# Patient Record
Sex: FEMALE | Race: WHITE | Employment: PART TIME | URBAN - METROPOLITAN AREA
[De-identification: names, ages, dates, MRNs, and addresses within clinical notes are randomized per-mention and may not be internally consistent; named-entity substitution may affect disease eponyms.]

---

## 2017-09-19 ENCOUNTER — HOSPITAL ENCOUNTER (OUTPATIENT)
Age: 32
Setting detail: OBSERVATION
Discharge: HOME | End: 2017-09-22
Attending: EMERGENCY MEDICINE | Admitting: OBSTETRICS & GYNECOLOGY
Payer: COMMERCIAL

## 2017-09-19 DIAGNOSIS — D62 ANEMIA DUE TO BLOOD LOSS, ACUTE: ICD-10-CM

## 2017-09-19 DIAGNOSIS — N93.9 VAGINAL BLEEDING: Primary | ICD-10-CM

## 2017-09-19 PROBLEM — E03.9 ACQUIRED HYPOTHYROIDISM: Status: ACTIVE | Noted: 2017-09-19

## 2017-09-19 PROBLEM — E66.01 MORBID OBESITY (HCC): Status: ACTIVE | Noted: 2017-09-19

## 2017-09-19 PROBLEM — D64.9 ANEMIA: Status: ACTIVE | Noted: 2017-09-19

## 2017-09-19 PROBLEM — N92.1 MENOMETRORRHAGIA: Status: ACTIVE | Noted: 2017-09-19

## 2017-09-19 LAB
ANION GAP SERPL CALC-SCNC: 7 MMOL/L (ref 7–16)
ATRIAL RATE: 102 BPM
BASOPHILS # BLD: 0.1 K/UL (ref 0–0.2)
BASOPHILS NFR BLD MANUAL: 1 % (ref 0–2)
BUN SERPL-MCNC: 5 MG/DL (ref 6–23)
CALCIUM SERPL-MCNC: 8.2 MG/DL (ref 8.3–10.4)
CALCULATED P AXIS, ECG09: 66 DEGREES
CALCULATED R AXIS, ECG10: 74 DEGREES
CALCULATED T AXIS, ECG11: 56 DEGREES
CHLORIDE SERPL-SCNC: 104 MMOL/L (ref 98–107)
CO2 SERPL-SCNC: 26 MMOL/L (ref 21–32)
CREAT SERPL-MCNC: 0.67 MG/DL (ref 0.6–1)
DIAGNOSIS, 93000: NORMAL
DIFFERENTIAL METHOD BLD: ABNORMAL
EOSINOPHIL # BLD: 0.1 K/UL (ref 0–0.8)
EOSINOPHIL NFR BLD MANUAL: 1 % (ref 1–8)
ERYTHROCYTE [DISTWIDTH] IN BLOOD BY AUTOMATED COUNT: 12.1 % (ref 11.9–14.6)
FIBRINOGEN PPP-MCNC: 318 MG/DL (ref 172–437)
GLUCOSE SERPL-MCNC: 104 MG/DL (ref 65–100)
HCG UR QL: NEGATIVE
HCT VFR BLD AUTO: 20.6 % (ref 35.8–46.3)
HGB BLD-MCNC: 6.4 G/DL (ref 11.7–15.4)
INR PPP: 0.9 (ref 0.9–1.2)
LYMPHOCYTES # BLD: 3 K/UL (ref 0.5–4.6)
LYMPHOCYTES NFR BLD MANUAL: 24 % (ref 16–44)
MCH RBC QN AUTO: 29.8 PG (ref 26.1–32.9)
MCHC RBC AUTO-ENTMCNC: 31.1 G/DL (ref 31.4–35)
MCV RBC AUTO: 95.8 FL (ref 79.6–97.8)
MONOCYTES # BLD: 0.9 K/UL (ref 0.1–1.3)
MONOCYTES NFR BLD MANUAL: 7 % (ref 3–9)
NEUTS BAND NFR BLD MANUAL: 1 % (ref 0–6)
NEUTS SEG # BLD: 8.4 K/UL (ref 1.7–8.2)
NEUTS SEG NFR BLD MANUAL: 66 % (ref 47–75)
P-R INTERVAL, ECG05: 146 MS
PLATELET # BLD AUTO: 327 K/UL (ref 150–450)
PLATELET COMMENTS,PCOM: ADEQUATE
PMV BLD AUTO: 11.4 FL (ref 10.8–14.1)
POTASSIUM SERPL-SCNC: 4 MMOL/L (ref 3.5–5.1)
PROTHROMBIN TIME: 9.9 SEC (ref 9.6–12)
Q-T INTERVAL, ECG07: 394 MS
QRS DURATION, ECG06: 90 MS
QTC CALCULATION (BEZET), ECG08: 513 MS
RBC # BLD AUTO: 2.15 M/UL (ref 4.05–5.25)
RBC MORPH BLD: ABNORMAL
SERVICE CMNT-IMP: NORMAL
SODIUM SERPL-SCNC: 137 MMOL/L (ref 136–145)
T4 FREE SERPL-MCNC: 0.9 NG/DL (ref 0.78–1.46)
TSH SERPL DL<=0.005 MIU/L-ACNC: 7.53 UIU/ML (ref 0.36–3.74)
VENTRICULAR RATE, ECG03: 102 BPM
WBC # BLD AUTO: 12.5 K/UL (ref 4.3–11.1)
WET PREP GENITAL: NORMAL
WET PREP GENITAL: NORMAL

## 2017-09-19 PROCEDURE — 96376 TX/PRO/DX INJ SAME DRUG ADON: CPT

## 2017-09-19 PROCEDURE — 96360 HYDRATION IV INFUSION INIT: CPT | Performed by: EMERGENCY MEDICINE

## 2017-09-19 PROCEDURE — 87210 SMEAR WET MOUNT SALINE/INK: CPT | Performed by: OBSTETRICS & GYNECOLOGY

## 2017-09-19 PROCEDURE — 84481 FREE ASSAY (FT-3): CPT | Performed by: OBSTETRICS & GYNECOLOGY

## 2017-09-19 PROCEDURE — 74011250636 HC RX REV CODE- 250/636: Performed by: EMERGENCY MEDICINE

## 2017-09-19 PROCEDURE — 77030013131 HC IV BLD ST ICUM -A

## 2017-09-19 PROCEDURE — 96374 THER/PROPH/DIAG INJ IV PUSH: CPT

## 2017-09-19 PROCEDURE — 96375 TX/PRO/DX INJ NEW DRUG ADDON: CPT

## 2017-09-19 PROCEDURE — 96361 HYDRATE IV INFUSION ADD-ON: CPT | Performed by: EMERGENCY MEDICINE

## 2017-09-19 PROCEDURE — 85025 COMPLETE CBC W/AUTO DIFF WBC: CPT | Performed by: EMERGENCY MEDICINE

## 2017-09-19 PROCEDURE — 81025 URINE PREGNANCY TEST: CPT | Performed by: OBSTETRICS & GYNECOLOGY

## 2017-09-19 PROCEDURE — P9059 PLASMA, FRZ BETWEEN 8-24HOUR: HCPCS | Performed by: OBSTETRICS & GYNECOLOGY

## 2017-09-19 PROCEDURE — 84439 ASSAY OF FREE THYROXINE: CPT | Performed by: OBSTETRICS & GYNECOLOGY

## 2017-09-19 PROCEDURE — 85610 PROTHROMBIN TIME: CPT | Performed by: EMERGENCY MEDICINE

## 2017-09-19 PROCEDURE — 99284 EMERGENCY DEPT VISIT MOD MDM: CPT | Performed by: EMERGENCY MEDICINE

## 2017-09-19 PROCEDURE — 93005 ELECTROCARDIOGRAM TRACING: CPT | Performed by: EMERGENCY MEDICINE

## 2017-09-19 PROCEDURE — 85384 FIBRINOGEN ACTIVITY: CPT | Performed by: OBSTETRICS & GYNECOLOGY

## 2017-09-19 PROCEDURE — 36430 TRANSFUSION BLD/BLD COMPNT: CPT

## 2017-09-19 PROCEDURE — 87491 CHLMYD TRACH DNA AMP PROBE: CPT | Performed by: OBSTETRICS & GYNECOLOGY

## 2017-09-19 PROCEDURE — 84443 ASSAY THYROID STIM HORMONE: CPT | Performed by: OBSTETRICS & GYNECOLOGY

## 2017-09-19 PROCEDURE — 99218 HC RM OBSERVATION: CPT

## 2017-09-19 PROCEDURE — 74011250636 HC RX REV CODE- 250/636: Performed by: OBSTETRICS & GYNECOLOGY

## 2017-09-19 PROCEDURE — 86923 COMPATIBILITY TEST ELECTRIC: CPT | Performed by: EMERGENCY MEDICINE

## 2017-09-19 PROCEDURE — 86900 BLOOD TYPING SEROLOGIC ABO: CPT | Performed by: EMERGENCY MEDICINE

## 2017-09-19 PROCEDURE — 80048 BASIC METABOLIC PNL TOTAL CA: CPT | Performed by: EMERGENCY MEDICINE

## 2017-09-19 PROCEDURE — P9016 RBC LEUKOCYTES REDUCED: HCPCS | Performed by: EMERGENCY MEDICINE

## 2017-09-19 PROCEDURE — 74011000250 HC RX REV CODE- 250: Performed by: OBSTETRICS & GYNECOLOGY

## 2017-09-19 RX ORDER — FUROSEMIDE 10 MG/ML
20 INJECTION INTRAMUSCULAR; INTRAVENOUS ONCE
Status: COMPLETED | OUTPATIENT
Start: 2017-09-19 | End: 2017-09-19

## 2017-09-19 RX ORDER — LEVOTHYROXINE SODIUM 75 UG/1
75 TABLET ORAL
Status: DISCONTINUED | OUTPATIENT
Start: 2017-09-20 | End: 2017-09-22 | Stop reason: HOSPADM

## 2017-09-19 RX ORDER — NORGESTIMATE AND ETHINYL ESTRADIOL 0.25-0.035
1 KIT ORAL DAILY
COMMUNITY
End: 2017-09-22

## 2017-09-19 RX ORDER — SODIUM CHLORIDE 9 MG/ML
250 INJECTION, SOLUTION INTRAVENOUS AS NEEDED
Status: DISCONTINUED | OUTPATIENT
Start: 2017-09-19 | End: 2017-09-22 | Stop reason: HOSPADM

## 2017-09-19 RX ORDER — DIPHENHYDRAMINE HCL 25 MG
25 CAPSULE ORAL
Status: DISCONTINUED | OUTPATIENT
Start: 2017-09-19 | End: 2017-09-22 | Stop reason: HOSPADM

## 2017-09-19 RX ADMIN — SODIUM CHLORIDE 250 ML: 900 INJECTION, SOLUTION INTRAVENOUS at 19:36

## 2017-09-19 RX ADMIN — WATER 25 MG: 1 INJECTION INTRAMUSCULAR; INTRAVENOUS; SUBCUTANEOUS at 18:20

## 2017-09-19 RX ADMIN — FUROSEMIDE 20 MG: 10 INJECTION, SOLUTION INTRAMUSCULAR; INTRAVENOUS at 22:06

## 2017-09-19 RX ADMIN — SODIUM CHLORIDE 1000 ML: 900 INJECTION, SOLUTION INTRAVENOUS at 13:22

## 2017-09-19 RX ADMIN — WATER 25 MG: 1 INJECTION INTRAMUSCULAR; INTRAVENOUS; SUBCUTANEOUS at 22:06

## 2017-09-19 NOTE — ED NOTES
TRANSFER - OUT REPORT:    Verbal report given to DOROTHY Romero on Lalitha Marshall  being transferred to [facility] for routine progression of care       Report consisted of patients Situation, Background, Assessment and   Recommendations(SBAR). Information from the following report(s) SBAR, ED Summary, Intake/Output, MAR and Cardiac Rhythm NSR was reviewed with the receiving nurse. Lines:   Peripheral IV 09/19/17 Right Arm (Active)   Site Assessment Clean, dry, & intact 9/19/2017  1:22 PM   Phlebitis Assessment 0 9/19/2017  1:22 PM   Infiltration Assessment 0 9/19/2017  1:22 PM   Dressing Status Clean, dry, & intact 9/19/2017  1:22 PM   Dressing Type Tape;Transparent 9/19/2017  1:22 PM        Opportunity for questions and clarification was provided. Patient transported with:   Registered Nurse    VTE prophylaxis orders have not been written for Lalitha Marshall. Patient and family given floor number and nurses name. Family updated re: pt status after security code verified.

## 2017-09-19 NOTE — ED PROVIDER NOTES
HPI Comments: Patient presents to the ER complaining of continued vaginal bleeding. Patient states she's had daily vaginal bleeding for approximately 11 weeks. States she was seen by her normal gynecologist last week and started on birth control. States bleeding has persisted. Now she has developed lightheadedness and shortness of breath. Reports an episode of syncope last evening. Was seen at her primary care physician's office today and noted to have a low hemoglobin of 5.9. She was sent to the ER for further evaluation. Patient is a 28 y.o. female presenting with vaginal bleeding. The history is provided by the patient. Vaginal Bleeding   This is a recurrent problem. The current episode started more than 1 week ago. The problem occurs daily. Associated symptoms include abdominal pain and shortness of breath. Pertinent negatives include no chest pain and no headaches. Past Medical History:   Diagnosis Date    Gallbladder disease     awaiting surgery    Knee pain 12/10/2012    Morbid obesity (HCC)     BMI 39.9    PCOS (polycystic ovarian syndrome) 12/10/2012       Past Surgical History:   Procedure Laterality Date    HX APPENDECTOMY  2002    HX CHOLECYSTECTOMY  2014         Family History:   Problem Relation Age of Onset    Seizures Sister     Diabetes Father     Hypertension Father     Cancer Maternal Grandmother      lung cancer    Cancer Maternal Grandfather     Cancer Paternal Grandfather      cirrhosis; colon cancer       Social History     Social History    Marital status:      Spouse name: N/A    Number of children: N/A    Years of education: N/A     Occupational History    Not on file.      Social History Main Topics    Smoking status: Never Smoker    Smokeless tobacco: Not on file    Alcohol use No    Drug use: No    Sexual activity: Yes     Other Topics Concern    Not on file     Social History Narrative         ALLERGIES: Review of patient's allergies indicates no known allergies. Review of Systems   Constitutional: Positive for fatigue. Negative for fever and unexpected weight change. HENT: Negative for congestion. Eyes: Negative for photophobia and visual disturbance. Respiratory: Positive for chest tightness and shortness of breath. Negative for cough and choking. Cardiovascular: Negative for chest pain and leg swelling. Gastrointestinal: Positive for abdominal pain. Negative for vomiting. Endocrine: Negative for polydipsia and polyphagia. Genitourinary: Positive for vaginal bleeding. Negative for frequency and pelvic pain. Musculoskeletal: Negative for back pain and gait problem. Allergic/Immunologic: Negative for food allergies and immunocompromised state. Neurological: Positive for syncope and light-headedness. Negative for numbness and headaches. All other systems reviewed and are negative. There were no vitals filed for this visit. Physical Exam   Constitutional: She is oriented to person, place, and time. She appears well-developed and well-nourished. HENT:   Head: Normocephalic and atraumatic. Mouth/Throat: Oropharynx is clear and moist.   Eyes: Conjunctivae are normal. Pupils are equal, round, and reactive to light. Neck: Normal range of motion. Neck supple. Cardiovascular: Regular rhythm, normal heart sounds and intact distal pulses. Tachycardia present. Pulmonary/Chest: Effort normal and breath sounds normal. No respiratory distress. She has no wheezes. Abdominal: Soft. Bowel sounds are normal. She exhibits no distension. Neurological: She is alert and oriented to person, place, and time. She has normal reflexes. No cranial nerve deficit. Nursing note and vitals reviewed.        MDM  Number of Diagnoses or Management Options  Anemia due to blood loss, acute:   Vaginal bleeding:   Diagnosis management comments: GYN has been paged  Will repeat labs, type and cross patient for 4 units  Also will give IV fluids    1:59 PM  Hgb is 6.4  Gyn currently evaluating patient       Amount and/or Complexity of Data Reviewed  Clinical lab tests: ordered and reviewed    Risk of Complications, Morbidity, and/or Mortality  Presenting problems: moderate  Diagnostic procedures: moderate  Management options: moderate    Patient Progress  Patient progress: stable    ED Course       Procedures

## 2017-09-19 NOTE — ED TRIAGE NOTES
Patient arrives from PCP office for vaginal bleeding for 11 days and HGB 5.9. Dr. Oriana Recinos @ bedside.

## 2017-09-19 NOTE — IP AVS SNAPSHOT
Johan Scott 
 
 
 [id number] [facility] 
[phone] Patient: Princess Jernigan MRN: [id number] You are allergic to the following No active allergies Recent Documentation Height Weight BMI OB Status Smoking Status 1.6 m 115.2 kg 44.99 kg/m2 Having regular periods Never Smoker Unresulted Labs Order Current Status FFP, ALLOCATE Preliminary result TYPE + CROSSMATCH Preliminary result Emergency Contacts Name Discharge Info Relation Home Work Mobile Onofre Caban  Spouse [3] [phone] Sirena Burt  Parent [1] [phone] About your hospitalization You were admitted on:  September 19, 2017 You last received care in the:  [address] You were discharged on:  September 22, 2017 Unit phone number:  [phone] Why you were hospitalized Your primary diagnosis was:  Acute Blood Loss Anemia Your diagnoses also included:  Menometrorrhagia, Morbid Obesity (Hcc), Acquired Hypothyroidism, Ovarian Cyst  
  
  
 
  
  
Providers Seen During Your Hospitalizations Provider Role Specialty Primary office phone Javid Olmedo MD Attending Provider Emergency Medicine [phone] Verenice Angelo MD Attending Provider Obstetrics & Gynecology [phone] Your Primary Care Physician (PCP) Primary Care Physician Office Phone Office Fax Gian Bob [phone] Follow-up Information Follow up With Details Comments Contact Info Nneka Marquez MD   [address] 
[phone] Current Discharge Medication List  
  
START taking these medications Dose & Instructions Dispensing Information Comments Morning Noon Evening Bedtime  
 conjugated estrogens 1.25 mg tablet Commonly known as:  PREMARIN Your last dose was: Your next dose is: Dose:  2.5 mg Take 2 Tabs by mouth four (4) times daily. Quantity:  120 Tab Refills:  1  
     
   
   
   
  
 ferrous sulfate 325 mg (65 mg iron) tablet Your last dose was: Your next dose is:    
   
   
 Dose:  325 mg Take 1 Tab by mouth two (2) times daily (with meals). Quantity:  60 Tab Refills:  6  
     
   
   
   
  
 levothyroxine 75 mcg tablet Commonly known as:  SYNTHROID Your last dose was: Your next dose is:    
   
   
 Dose:  75 mcg Take 1 Tab by mouth Daily (before breakfast). Quantity:  30 Tab Refills:  12  
     
   
   
   
  
 medroxyPROGESTERone 10 mg tablet Commonly known as:  PROVERA Your last dose was: Your next dose is:    
   
   
 Dose:  10 mg Take 1 Tab by mouth daily. Quantity:  30 Tab Refills:  3 STOP taking these medications OTHER  
   
  
 SPRINTEC (28) 0.25-35 mg-mcg Tab Generic drug:  norgestimate-ethinyl estradiol Where to Get Your Medications Information on where to get these meds will be given to you by the nurse or doctor. ! Ask your nurse or doctor about these medications  
  conjugated estrogens 1.25 mg tablet  
 ferrous sulfate 325 mg (65 mg iron) tablet  
 levothyroxine 75 mcg tablet  
 medroxyPROGESTERone 10 mg tablet Discharge Instructions Abnormal Uterine Bleeding: Care Instructions Your Care Instructions Abnormal uterine bleeding (AUB) is irregular bleeding from the uterus that is longer or heavier than usual or does not occur at your regular time. Sometimes it is caused by changes in hormone levels. It can also be caused by growths in the uterus, such as fibroids or polyps. Sometimes a cause cannot be found. You may have heavy bleeding when you are not expecting your period. Your doctor may suggest a pregnancy test, if you think you are pregnant. Follow-up care is a key part of your treatment and safety. Be sure to make and go to all appointments, and call your doctor if you are having problems. It's also a good idea to know your test results and keep a list of the medicines you take. How can you care for yourself at home? · Be safe with medicines. Take pain medicines exactly as directed. ¨ If the doctor gave you a prescription medicine for pain, take it as prescribed. ¨ If you are not taking a prescription pain medicine, ask your doctor if you can take an over-the-counter medicine. · You may be low in iron because of blood loss. Eat a balanced diet that is high in iron and vitamin C. Foods rich in iron include red meat, shellfish, eggs, beans, and leafy green vegetables. Talk to your doctor about whether you need to take iron pills or a multivitamin. When should you call for help? Call 911 anytime you think you may need emergency care. For example, call if: 
· You passed out (lost consciousness). Call your doctor now or seek immediate medical care if: 
· You have sudden, severe pain in your belly or pelvis. · You have severe vaginal bleeding. You are soaking through your usual pads or tampons every hour for 2 or more hours. · You feel dizzy or lightheaded, or you feel like you may faint. Watch closely for changes in your health, and be sure to contact your doctor if: 
· You have new belly or pelvic pain. · You have a fever. · Your bleeding gets worse or lasts longer than 1 week. · You think you may be pregnant. Where can you learn more? Go to http://tan-monalisa.info/. Enter G460 in the search box to learn more about \"Abnormal Uterine Bleeding: Care Instructions. \" Current as of: October 13, 2016 Content Version: 11.3 © 6967-6312 [employer], [employer].  Care instructions adapted under license by [employer] (which disclaims liability or warranty for this information). If you have questions about a medical condition or this instruction, always ask your healthcare professional. [address] any warranty or liability for your use of this information. DISCHARGE SUMMARY from Nurse The following personal items are in your possession at time of discharge: 
 
  
  
  
  
  
Clothing: At bedside PATIENT INSTRUCTIONS: 
 
Assessment and Plan:  D/c home on this regimen. Office 1-2wks. D/w pt and partner that this does not provide BC, and she should not get pregnant right now. D/w with them that this adnexal mass needs to be addressed. After general anesthesia or intravenous sedation, for 24 hours or while taking prescription Narcotics: · Limit your activities · Do not drive and operate hazardous machinery · Do not make important personal or business decisions · Do  not drink alcoholic beverages · If you have not urinated within 8 hours after discharge, please contact your surgeon on call. Report the following to your surgeon: 
· Excessive pain, swelling, redness or odor of or around the surgical area · Temperature over 100.5 · Nausea and vomiting lasting longer than 4 hours or if unable to take medications · Any signs of decreased circulation or nerve impairment to extremity: change in color, persistent  numbness, tingling, coldness or increase pain · Any questions What to do at Home: 
Recommended activity: Activity as tolerated. If you experience any of the following symptoms increase in bleeding, dizziness/chest pain, please follow up with MD. 
 
 
*  Please give a list of your current medications to your Primary Care Provider. *  Please update this list whenever your medications are discontinued, doses are 
    changed, or new medications (including over-the-counter products) are added. *  Please carry medication information at all times in case of emergency situations. These are general instructions for a healthy lifestyle: No smoking/ No tobacco products/ Avoid exposure to second hand smoke Surgeon General's Warning:  Quitting smoking now greatly reduces serious risk to your health. Obesity, smoking, and sedentary lifestyle greatly increases your risk for illness A healthy diet, regular physical exercise & weight monitoring are important for maintaining a healthy lifestyle You may be retaining fluid if you have a history of heart failure or if you experience any of the following symptoms:  Weight gain of 3 pounds or more overnight or 5 pounds in a week, increased swelling in our hands or feet or shortness of breath while lying flat in bed. Please call your doctor as soon as you notice any of these symptoms; do not wait until your next office visit. Recognize signs and symptoms of STROKE: 
 
F-face looks uneven A-arms unable to move or move unevenly S-speech slurred or non-existent T-time-call 911 as soon as signs and symptoms begin-DO NOT go Back to bed or wait to see if you get better-TIME IS BRAIN. Warning Signs of HEART ATTACK Call 911 if you have these symptoms: 
? Chest discomfort. Most heart attacks involve discomfort in the center of the chest that lasts more than a few minutes, or that goes away and comes back. It can feel like uncomfortable pressure, squeezing, fullness, or pain. ? Discomfort in other areas of the upper body. Symptoms can include pain or discomfort in one or both arms, the back, neck, jaw, or stomach. ? Shortness of breath with or without chest discomfort. ? Other signs may include breaking out in a cold sweat, nausea, or lightheadedness. Don't wait more than five minutes to call [address]! Fast action can save your life. Calling 911 is almost always the fastest way to get lifesaving treatment.  Emergency Medical Services staff can begin treatment when they arrive  up to an hour sooner than if someone gets to the hospital by car. The discharge information has been reviewed with the patient. The patient verbalized understanding. Discharge medications reviewed with the patient and appropriate educational materials and side effects teaching were provided. Hand-Washing: Care Instructions Your Care Instructions It is important for caregivers to wash their hands properly. This is the single best way to prevent the spread of infections. Hand-washing can help keep you from getting sick. It is easy, doesn't cost much, and it works. Make sure that you and your caregivers follow safe hand-washing routines. Caregivers may include health care workers or family members at home or in a care facility. You can talk to them about this information on hand-washing. Follow-up care is a key part of your treatment and safety. Be sure to make and go to all appointments, and call your doctor if you are having problems. It's also a good idea to know your test results and keep a list of the medicines you take. How can you care for yourself at home? · Caregivers should wash their hands with soap and water: ¨ When their hands are dirty, especially after being exposed to body fluids. This includes blood. ¨ When their hands may have been exposed to germs that could spread infection. ¨ After they touch broken skin, sores, or wound bandages. ¨ After they use the bathroom. · At other times, caregivers can use an alcohol-based gel  or soap and water to clean hands. This should be done: ¨ Before and after any contact with you. ¨ After they take off gloves. ¨ Before they handle a device that touches your body (even if gloves are used). ¨ After they touch any objects near you, such as medical equipment, lights, or doorknobs. ¨ Before they handle medicine or prepare food. Proper hand-washing for caregivers · When using an alcohol-based gel , fill your palm with the gel. Then spread it all over your hands. Rub your hands together until they are dry. · When washing hands with soap and water: ¨ Get your hands wet. Then use enough soap to cover your whole hands. ¨ Rub your hands together hard, in a [place]. Be sure that you cover all surfaces. Rub your wrists, the backs of your hands, between your fingers, and under your fingernails. Wash your hands for at least 30 seconds. ¨ Rinse your hands with water. But don't use hot water. It may irritate your hands. ¨ Use a paper towel to hold the faucet handle when you turn off the water. ¨ Dry your hands well with a paper towel. Don't use towels that have been used by others or used more than once. · If you use bar soap, use small bars. Set the soap on a rack that lets water drain. Where can you learn more? Go to http://tan-monalisa.info/. Enter V076 in the search box to learn more about \"Hand-Washing: Care Instructions. \" Current as of: March 3, 2017 Content Version: 11.3 © 6133-6072 [employer]. Care instructions adapted under license by [employer] (which disclaims liability or warranty for this information). If you have questions about a medical condition or this instruction, always ask your healthcare professional. [address] [address] any warranty or liability for your use of this information. Secondhand Smoke: Care Instructions Your Care Instructions Secondhand smoke comes from the burning end of a cigarette, cigar, or pipe and the smoke that a smoker exhales. The smoke contains nicotine and many other harmful chemicals. Breathing secondhand smoke can cause or worsen health problems including cancer, asthma, coronary artery disease, and respiratory infections. It can make your eyes and nose burn and cause a sore throat. Secondhand smoke is especially bad for babies and young children whose lungs are still developing. Babies whose parents smoke are more likely to have ear infections, pneumonia, and bronchitis in the first few years of their lives. Secondhand smoke can make asthma symptoms worse in children. If you are pregnant, it is important that you not smoke and that you avoid secondhand smoke. You are more likely to give birth to a baby who weighs less than expected (low birth weight) if you smoke. And your baby may have a greater risk for sudden infant death syndrome (SIDS). Babies whose mothers are exposed to secondhand smoke during pregnancy have a higher risk for health problems. Follow-up care is a key part of your treatment and safety. Be sure to make and go to all appointments, and call your doctor if you are having problems. It's also a good idea to know your test results and keep a list of the medicines you take. How can you care for yourself at home? · Do not smoke or let anyone else smoke in your home. If people must smoke, ask them to go outside. · If people do smoke in your home, choose a room where you can open a window or use a fan to get the smoke outside. · Do not let anyone smoke in your car. If someone must smoke, pull over in a safe place and let him or her smoke away from the car. · Ask your employer to make sure that you have a smoke-free work area. · Make sure that your children are not exposed to secondhand smoke at day care, school, and after-school programs. · Try to choose nonsmoking bars, restaurants, and other public places when you go out. · Help your family and friends who smoke to quit by encouraging them to try. Tell them about treatment resources. Having support from others often helps. · If you smoke, quit. Quitting is hard, but there are ways to boost your chance of quitting tobacco for good. ¨ Use nicotine gum, patches, or lozenges. Call a quitline.  Ask your doctor about stop-smoking programs and medicines. ¨ Keep trying. When should you call for help? Watch closely for changes in your health, and be sure to contact your doctor if you have any problems. Where can you learn more? Go to http://tan-monalisa.info/. Enter L004 in the search box to learn more about \"Secondhand Smoke: Care Instructions. \" Current as of: March 20, 2017 Content Version: 11.3 © 5258-1762 [employer]. Care instructions adapted under license by [employer] (which disclaims liability or warranty for this information). If you have questions about a medical condition or this instruction, always ask your healthcare professional. [address] any warranty or liability for your use of this information. Discharge Orders None [facility] Announcement We are excited to announce that we are making your provider's discharge notes available to you in [facility]. You will see these notes when they are completed and signed by the physician that discharged you from your recent hospital stay. If you have any questions or concerns about any information you see in [facility], please call the Health Information Department where you were seen or reach out to your Primary Care Provider for more information about your plan of care. Introducing [facility]! Tera Alexis introduces [facility] patient portal. Now you can access parts of your medical record, email your doctor's office, and request medication refills online. 1. In your internet browser, go to [email or web address] 2. Click on the First Time User? Click Here link in the Sign In box. You will see the New Member Sign Up page. 3. Enter your [facility] Access Code exactly as it appears below. You will not need to use this code after youve completed the sign-up process. If you do not sign up before the expiration date, you must request a new code. · [facility] Access Code: [email or web address] Expires: 12/18/2017 11:33 AM 
 
4. Enter the last four digits of your Social Security Number (xxxx) and Date of Birth (mm/dd/yyyy) as indicated and click Submit. You will be taken to the next sign-up page. 5. Create a [facility] ID. This will be your [facility] login ID and cannot be changed, so think of one that is secure and easy to remember. 6. Create a [facility] password. You can change your password at any time. 7. Enter your Password Reset Question and Answer. This can be used at a later time if you forget your password. 8. Enter your e-mail address. You will receive e-mail notification when new information is available in [address] 9. Click Sign Up. You can now view and download portions of your medical record. 10. Click the Download Summary menu link to download a portable copy of your medical information. If you have questions, please visit the Frequently Asked Questions section of the [facility] website. Remember, [facility] is NOT to be used for urgent needs. For medical emergencies, dial 911. Now available from your iPhone and Android! General Information Please provide this summary of care documentation to your next provider. Patient Signature:  ____________________________________________________________ Date:  ____________________________________________________________  
  
Guinevere Coup Provider Signature:  ____________________________________________________________ Date:  ____________________________________________________________

## 2017-09-19 NOTE — H&P
.  Gynecology History and Physical    Name: Michael Valadez MRN: [id number] SSN: [id number]    YOB: 1985  Age: 28 y.o. Sex: female       Subjective:      Chief complaint:  Heavy bleeding, anemia     Dontrell Carrillo is a 28 y.o. G0  transferred from [facility] due to hgb 5.8 and continued VB. Pt reports a long hx of PCOS. Was placed on OCPs at the age of 15yo but DC'd these 2yrs ago in attempts to conceive. When she DC'd her OCPs, she reports having fairly light and regular periods Q month until about 4.5 months ago at which time she stopped having periods. She went to a Naturopath who told her that her hormones were \"all messed up\" and placed her on Iodine and herbs. She started having bleeding again 11wks ago--off and on heavy/light. As of 3wks ago she began having significantly heavier VB requiring pad changes Q1hr. States can't wear tampons b/c they just soak through. C/o associated significant fatigue and lightheadedness, possible episode of syncope. Denies CP but +palpitations. Some associated cramping, no VB. No hx STDs. Reports being seen in the ER at [facility] 1wk ago for c/o VB but hgb was stable at 10 at that time and she was sent home on [address]. No pelvic US was performed. States her VB has been unchanged since starting the OCP. Seen in office today by PCP Dr. Anila Duncan and POC hgb 5.8-->sent here for further management. Pt is morbidly obese--states has gained 25lbs in past 4 months but denies any change in lifestyle/eating/exercise. States she was also told by her Naturopath that her T3 was low but the Iodine should help.       OB History     No data available        Past Medical History:   Diagnosis Date    Gallbladder disease     awaiting surgery    Knee pain 12/10/2012    Morbid obesity (HCC)     BMI 39.9    PCOS (polycystic ovarian syndrome) 12/10/2012     Past Surgical History:   Procedure Laterality Date    HX APPENDECTOMY 2002    HX CHOLECYSTECTOMY  2014     Social History     Occupational History    Not on file. Social History Main Topics    Smoking status: Never Smoker    Smokeless tobacco: Not on file    Alcohol use No    Drug use: No    Sexual activity: Yes     Family History   Problem Relation Age of Onset    Seizures Sister     Diabetes Father     Hypertension Father     Cancer Maternal Grandmother      lung cancer    Cancer Maternal Grandfather     Cancer Paternal Grandfather      cirrhosis; colon cancer        No Known Allergies  Prior to Admission medications    Medication Sig Start Date End Date Taking? Authorizing Provider   norgestimate-ethinyl estradiol ([id number] [id number],) 0.25-35 mg-mcg tab Take 1 Tab by mouth daily. Yes Phys Other, MD   OTHER Indications: Iodral supplement   Yes Phys Other, MD        Review of Systems  . Review of Systems   Constitutional: Positive for fatigue. Negative for fever and unexpected weight change. HENT: Negative for congestion. Eyes: Negative for photophobia and visual disturbance. Respiratory: Positive for chest tightness and shortness of breath. Negative for cough and choking. Cardiovascular: Negative for chest pain and leg swelling. Gastrointestinal: Positive for abdominal pain. Negative for vomiting. Endocrine: Negative for polydipsia and polyphagia. Genitourinary: Positive for vaginal bleeding. Negative for frequency and pelvic pain. Musculoskeletal: Negative for back pain and gait problem. Allergic/Immunologic: Negative for food allergies and immunocompromised state. Neurological: Positive for syncope and light-headedness. Negative for numbness and headaches. All other systems reviewed and are negative.     Objective:     Vitals:    09/19/17 1306 09/19/17 1331   BP: 126/64 119/72   Pulse: (!) 115 (!) 101   Resp: 18    Temp: 97.9 °F (36.6 °C)    SpO2: 100% 100%   Weight: 254 lb (115.2 kg)    Height: 5' 3\" (1.6 m)        Physical Exam:  Constitutional: She appears well-developed and well-nourished. No distress. HENT:    Head: Normocephalic and atraumatic. Lungs: CTAB, effort normal   Cardiovascular: Tachy, RR   Abd:  Morbidly obese, TTP   Skin: She is not diaphoretic. Psychiatric: She has a normal mood and affect. Her behavior is normal. Thought content normal.       Pelvic:    External genitalia wnl, no lesions, rashes; light blood noted on perineum   Clitoris and urethra midline  Vagina light pink, moist, well rugated; approx 1/2 dollar sized blood clot (clotted) noted in vaginal vault. No active VB noted   Cervix without lesion/masses, DC wnl, no CMT   Uterus/adnexa difficult to estimate in size due to body habitus but uterus feels somewhat enlarged; NTTP       Labs:  Recent Results (from the past 24 hour(s))   AMB POC COMPLETE CBC,AUTOMATED ENTER    Collection Time: 09/19/17 11:15 AM   Result Value Ref Range    WBC (POC) 9.0 4.1 - 10.9 10^3/ul    ABS. LYMPHS (POC) 1.6 0.6 - 4.1 10^3/ul    Mid # (POC) 0.9 0 - 1.8 10^3/ul    ABS.  GRANS (POC) 6.5 2 - 7.8 10^3/ul    LYMPHOCYTES (POC) 18.0 10 - 58.5 %    MID% POC 10.3 0.1 - 24 %    GRANULOCYTES (POC) 71.7 37 - 92 %    RBC (POC) 1.96 (A) 4.3 - 5.6 10^6/ul    HGB (POC) 5.8 (A) 13.5 - 17 g/dL    HCT (POC) 19.0 (A) 40 - 51 %    MCV (POC) 96.9 (A) 81 - 95 fL    MCH (POC) 29.6 27 - 33 pg    MCHC (POC) 30.5 (A) 32.5 - 35.5 g/dL    RDW (POC) 12.6 11.5 - 14.5 %    PLATELET (POC) 506 344 - 440 10^3/ul    MPV (POC) 7.7 0 - 49.9 fL   CBC WITH AUTOMATED DIFF    Collection Time: 09/19/17  1:10 PM   Result Value Ref Range    WBC 12.5 (H) 4.3 - 11.1 K/uL    RBC 2.15 (L) 4.05 - 5.25 M/uL    HGB 6.4 (LL) 11.7 - 15.4 g/dL    HCT 20.6 (LL) 35.8 - 46.3 %    MCV 95.8 79.6 - 97.8 FL    MCH 29.8 26.1 - 32.9 PG    MCHC 31.1 (L) 31.4 - 35.0 g/dL    RDW 12.1 11.9 - 14.6 %    PLATELET 550 904 - 567 K/uL    MPV 11.4 10.8 - 14.1 FL    NEUTROPHILS 66 47 - 75 %    BAND NEUTROPHILS 1 0 - 6 %    LYMPHOCYTES 24 16 - 44 %    MONOCYTES 7 3 - 9 %    EOSINOPHILS 1 1 - 8 %    BASOPHILS 1 0 - 2 %    ABS. NEUTROPHILS 8.4 (H) 1.7 - 8.2 K/UL    ABS. LYMPHOCYTES 3.0 0.5 - 4.6 K/UL    ABS. MONOCYTES 0.9 0.1 - 1.3 K/UL    ABS. EOSINOPHILS 0.1 0.0 - 0.8 K/UL    ABS.  BASOPHILS 0.1 0.0 - 0.2 K/UL    RBC COMMENTS NORMOCYTIC/NORMOCHROMIC      PLATELET COMMENTS ADEQUATE      DF MANUAL     PROTHROMBIN TIME + INR    Collection Time: 09/19/17  1:10 PM   Result Value Ref Range    Prothrombin time 9.9 9.6 - 12.0 sec    INR 0.9 0.9 - 1.2     METABOLIC PANEL, BASIC    Collection Time: 09/19/17  1:10 PM   Result Value Ref Range    Sodium 137 136 - 145 mmol/L    Potassium 4.0 3.5 - 5.1 mmol/L    Chloride 104 98 - 107 mmol/L    CO2 26 21 - 32 mmol/L    Anion gap 7 7 - 16 mmol/L    Glucose 104 (H) 65 - 100 mg/dL    BUN 5 (L) 6 - 23 MG/DL    Creatinine 0.67 0.6 - 1.0 MG/DL    GFR est AA >60 >60 ml/min/1.73m2    GFR est non-AA >60 >60 ml/min/1.73m2    Calcium 8.2 (L) 8.3 - 10.4 MG/DL   TYPE + CROSSMATCH    Collection Time: 09/19/17  1:10 PM   Result Value Ref Range    Crossmatch Expiration 09/22/2017     ABO/Rh(D) A POSITIVE     Antibody screen NEG     Unit number [id number]     Blood component type RC LR AS5     Unit division 00     Status of unit ALLOCATED     Crossmatch result Compatible     Unit number [id number]     Blood component type RC LR AS5     Unit division 00     Status of unit ALLOCATED     Crossmatch result Compatible     Unit number [id number]     Blood component type RC LR AS5     Unit division 00     Status of unit ALLOCATED     Crossmatch result Compatible     Unit number [id number]     Blood component type RC LR AS5     Unit division 00     Status of unit ALLOCATED     Crossmatch result Compatible    FIBRINOGEN    Collection Time: 09/19/17  1:10 PM   Result Value Ref Range    Fibrinogen 318 172 - 437 mg/dL   EKG, 12 LEAD, INITIAL    Collection Time: 09/19/17  1:28 PM   Result Value Ref Range    Ventricular Rate 102 BPM    Atrial Rate 102 BPM    P-R Interval 146 ms    QRS Duration 90 ms    Q-T Interval 394 ms    QTC Calculation (Bezet) 513 ms    Calculated P Axis 66 degrees    Calculated R Axis 74 degrees    Calculated T Axis 56 degrees    Diagnosis       Sinus tachycardia  Otherwise normal ECG  No previous ECGs available  Confirmed by DEBORAH DONOVAN (), [employer] ([id number]) on 9/19/2017 2:58:13 PM     WET PREP    Collection Time: 09/19/17  2:46 PM   Result Value Ref Range    Special Requests: NO SPECIAL REQUESTS      Wet prep WBC 0 TO 3 PER HPF     Wet prep [facility]              Assessment/Plan:       27 yo G0 w/ menometrorrhagia causing severe anemia, failed OCPs:    1) HMB/IMB:     -admit for obs   -TSH   -GC/Chlam, wet prep    -IV estrogen (CEE) 25mg IV Q6hrs (may do Q3 if necessary)   -TVUS once blood products are in and VB stable   -Discussed possible D&C if bleeding not responsive to medical mgmnt (not actively bleeding at this time)    -needs EMB once outpt to eval for hyperplasia/malig given her morbid obesity    2) Anemia:   -4uPRBCs ordered by BHARTI DONOVAN   -1uFFP added   -coags wnl    Signed By:  Domonique Ernandez MD     September 19, 2017

## 2017-09-19 NOTE — ED NOTES
Pt states she has been bleeding for 11 weeks, saw an OB who sent her home on [facility] and told her to follow up in 3 months. Hgb found to be 5.9 today at checkup.  Pt states she has had one syncopal episode, but has not had one today, just feels weak and dizzy

## 2017-09-19 NOTE — IP AVS SNAPSHOT
[address] 
[phone] Patient: Leeanne Chisholm MRN: [id number] VDR:[id number] Current Discharge Medication List  
  
START taking these medications Dose & Instructions Dispensing Information Comments Morning Noon Evening Bedtime  
 conjugated estrogens 1.25 mg tablet Commonly known as:  PREMARIN Your last dose was: Your next dose is:    
   
   
 Dose:  2.5 mg Take 2 Tabs by mouth four (4) times daily. Quantity:  120 Tab Refills:  1  
     
   
   
   
  
 ferrous sulfate 325 mg (65 mg iron) tablet Your last dose was: Your next dose is:    
   
   
 Dose:  325 mg Take 1 Tab by mouth two (2) times daily (with meals). Quantity:  60 Tab Refills:  6  
     
   
   
   
  
 levothyroxine 75 mcg tablet Commonly known as:  SYNTHROID Your last dose was: Your next dose is:    
   
   
 Dose:  75 mcg Take 1 Tab by mouth Daily (before breakfast). Quantity:  30 Tab Refills:  12  
     
   
   
   
  
 medroxyPROGESTERone 10 mg tablet Commonly known as:  PROVERA Your last dose was: Your next dose is:    
   
   
 Dose:  10 mg Take 1 Tab by mouth daily. Quantity:  30 Tab Refills:  3 STOP taking these medications OTHER  
   
  
 SPRINTEC (28) 0.25-35 mg-mcg Tab Generic drug:  norgestimate-ethinyl estradiol Where to Get Your Medications Information on where to get these meds will be given to you by the nurse or doctor. ! Ask your nurse or doctor about these medications  
  conjugated estrogens 1.25 mg tablet  
 ferrous sulfate 325 mg (65 mg iron) tablet  
 levothyroxine 75 mcg tablet  
 medroxyPROGESTERone 10 mg tablet

## 2017-09-20 ENCOUNTER — APPOINTMENT (OUTPATIENT)
Dept: ULTRASOUND IMAGING | Age: 32
End: 2017-09-20
Attending: OBSTETRICS & GYNECOLOGY
Payer: COMMERCIAL

## 2017-09-20 PROBLEM — N83.209 OVARIAN CYST: Status: ACTIVE | Noted: 2017-09-20

## 2017-09-20 LAB
C TRACH RRNA SPEC QL NAA+PROBE: NEGATIVE
HCT VFR BLD AUTO: 26.2 % (ref 35.8–46.3)
HGB BLD-MCNC: 8.7 G/DL (ref 11.7–15.4)
N GONORRHOEA RRNA SPEC QL NAA+PROBE: NEGATIVE
SPECIMEN SOURCE: NORMAL

## 2017-09-20 PROCEDURE — 74011000250 HC RX REV CODE- 250: Performed by: OBSTETRICS & GYNECOLOGY

## 2017-09-20 PROCEDURE — 96376 TX/PRO/DX INJ SAME DRUG ADON: CPT

## 2017-09-20 PROCEDURE — 74011250637 HC RX REV CODE- 250/637: Performed by: OBSTETRICS & GYNECOLOGY

## 2017-09-20 PROCEDURE — 74011250636 HC RX REV CODE- 250/636: Performed by: OBSTETRICS & GYNECOLOGY

## 2017-09-20 PROCEDURE — 36415 COLL VENOUS BLD VENIPUNCTURE: CPT | Performed by: OBSTETRICS & GYNECOLOGY

## 2017-09-20 PROCEDURE — 77030013131 HC IV BLD ST ICUM -A

## 2017-09-20 PROCEDURE — 76856 US EXAM PELVIC COMPLETE: CPT

## 2017-09-20 PROCEDURE — 99218 HC RM OBSERVATION: CPT

## 2017-09-20 PROCEDURE — 36430 TRANSFUSION BLD/BLD COMPNT: CPT

## 2017-09-20 PROCEDURE — P9016 RBC LEUKOCYTES REDUCED: HCPCS | Performed by: EMERGENCY MEDICINE

## 2017-09-20 PROCEDURE — 85018 HEMOGLOBIN: CPT | Performed by: OBSTETRICS & GYNECOLOGY

## 2017-09-20 RX ORDER — LANOLIN ALCOHOL/MO/W.PET/CERES
1 CREAM (GRAM) TOPICAL 2 TIMES DAILY WITH MEALS
Status: DISCONTINUED | OUTPATIENT
Start: 2017-09-20 | End: 2017-09-22 | Stop reason: HOSPADM

## 2017-09-20 RX ORDER — ASCORBIC ACID 500 MG
500 TABLET ORAL 2 TIMES DAILY
Status: DISCONTINUED | OUTPATIENT
Start: 2017-09-20 | End: 2017-09-22 | Stop reason: HOSPADM

## 2017-09-20 RX ADMIN — WATER 25 MG: 1 INJECTION INTRAMUSCULAR; INTRAVENOUS; SUBCUTANEOUS at 05:45

## 2017-09-20 RX ADMIN — WATER 25 MG: 1 INJECTION INTRAMUSCULAR; INTRAVENOUS; SUBCUTANEOUS at 16:59

## 2017-09-20 RX ADMIN — WATER 25 MG: 1 INJECTION INTRAMUSCULAR; INTRAVENOUS; SUBCUTANEOUS at 22:55

## 2017-09-20 RX ADMIN — WATER 25 MG: 1 INJECTION INTRAMUSCULAR; INTRAVENOUS; SUBCUTANEOUS at 10:35

## 2017-09-20 RX ADMIN — OXYCODONE HYDROCHLORIDE AND ACETAMINOPHEN 500 MG: 500 TABLET ORAL at 17:02

## 2017-09-20 RX ADMIN — FERROUS SULFATE TAB 325 MG (65 MG ELEMENTAL FE) 325 MG: 325 (65 FE) TAB at 17:02

## 2017-09-20 RX ADMIN — LEVOTHYROXINE SODIUM 75 MCG: 75 TABLET ORAL at 07:31

## 2017-09-20 NOTE — PROGRESS NOTES
H&H results called to Dr. Lewis Forth. States NOT to transfuse the 4th unit of PRBC's now. States it is OK for pt to eat.

## 2017-09-20 NOTE — PROGRESS NOTES
Called RN for update on pt. No active VB at this time. S/p 1st dose of CEE and 1st unit PRBCs and 1 FFP. About to start 2nd unit of PRBCs. Scheduled for 2 additional units PRBCs but will have H/H performed 30min after 3rd unit of PRBCs to determine whether 4th unit warranted. RN does report pt w/ some chest heaviness. O2 sat 99%. Will give IV lasix 20 x1 now and repeat PRN. Call w/ any concern for PE or pulmonary edema. Pt is at higher risk of VTE given morbid obesity w/ estrogen and receiving blood products--SCDs on. Planning for TVUS in AM as unable to perform while pt receiving blood products. Hypothyroid--Synthroid started     Anticipate if normal US findings in the am, s/p 24hrs of IV Premarin will then plan for DC of IV estrogen and change to PO Premarin 2.5mg 4x/day WITH addition of Provera 10mg every day for at least 10 days before [address] both to allow a withdrawal bleed. Will need to at least continue w/ cyclic Provera for endometrial protection. Close FU in office w/ EMB is essential to prevent future hospital admissions once deemed appropriate for DC.           Keegan Zayas MD

## 2017-09-20 NOTE — PROGRESS NOTES
AM assessment completed. Pt alert/oriented x4. Respirations unlabored. Lung sounds clear. Pt denies SOB/chest pain. Abdomen soft, nontender. Pt voiding and urine is bloody. Pt given pads and instructed to report pad count to RN. Pt up ad edwin. Wearing SCDs when in bed. All safety measures are in place. Encouraged pt to call if any needs arise.

## 2017-09-20 NOTE — PROGRESS NOTES
Patient complained of feeling pressure in her chest. Also having some dizziness and itching in her legs. Also noted patient is having bleeding in her urine and blood clots. Vitals are stable-MEWS score is 2. Khanh DONOVAN and let her know about the pressure, dizziness and itching. She ordered a 1 time dosage of lasix and stated to make sure the patient is wearing her SCD's. Will continue to monitor patient.

## 2017-09-20 NOTE — PROGRESS NOTES
Received from ER with first unit of blood infusing. RN arrived with patient. Imani Cazares Respirations present. Even and unlabored. No s/s of distress. Zero c/o pain at this time. Call light within reach. Encouraged patient to call for assistance. Patient verbalizes understanding. See Doc Flowsheet for assessment details. Patient resting in bed. Family at bedside. Right arm saline well intact with blood infusing. Alert and oriented times 4. Ambulating to bathroom to void.

## 2017-09-20 NOTE — PROGRESS NOTES
Problem: Interdisciplinary Rounds  Goal: Interdisciplinary Rounds  Interdisciplinary team rounds were held 9/20/2017 with the following team members:Care Management, Nursing, Nutrition and Pharmacy and the patient and mother. Plan of care discussed. See clinical pathway and/or care plan for interventions and desired outcomes.

## 2017-09-20 NOTE — PROGRESS NOTES
Assessment completed and documented. Respirations even but patient complains of pressure in her chest-will page MD. UP ad edwin. Alert and oriented. Vitals stable. Will continue to monitor.

## 2017-09-20 NOTE — PROGRESS NOTES
Called US for 2nd time today to see when they will be getting to pt. They say they are backed up and it will be this afternoon.

## 2017-09-20 NOTE — PROGRESS NOTES
Gynecology Progress Note    Patient doing well       Vitals:  Blood pressure 133/86, pulse 99, temperature 98.9 °F (37.2 °C), resp. rate 19, height 5' 3\" (1.6 m), weight 254 lb (115.2 kg), last menstrual period 2017, SpO2 98 %. Temp (24hrs), Av.4 °F (36.9 °C), Min:97 °F (36.1 °C), Max:99.5 °F (37.5 °C)        Exam:  Patient without distress. Normal respirations, unlabored               Abdomen soft,  nontender. Lower extremities are negative for swelling, cords, or tenderness. Lab/Data Review:  CBC:   Lab Results   Component Value Date/Time    HGB 8.7 (L) 2017 08:03 AM    HCT 26.2 (L) 2017 08:03 AM       Assessment and Plan:  Patient appears to be doing well. Hgb still a bit low, will plan to repeat CBC in the morning. Pt desires to avoid getting another unit of blood unless she must have it. Ultrasound today ordered. Will continue to monitor her bleeding and if no orthostatic sx and bleeding continues to improve, would plan to transition to oral estrogen and eventual provera per Dr. Nivia angela (see prior note).

## 2017-09-21 LAB
HCT VFR BLD AUTO: 24.4 % (ref 35.8–46.3)
HGB BLD-MCNC: 8 G/DL (ref 11.7–15.4)
T3FREE SERPL-MCNC: 2.9 PG/ML (ref 2–4.4)

## 2017-09-21 PROCEDURE — 74011000250 HC RX REV CODE- 250: Performed by: OBSTETRICS & GYNECOLOGY

## 2017-09-21 PROCEDURE — 74011250636 HC RX REV CODE- 250/636: Performed by: OBSTETRICS & GYNECOLOGY

## 2017-09-21 PROCEDURE — 96376 TX/PRO/DX INJ SAME DRUG ADON: CPT

## 2017-09-21 PROCEDURE — 99218 HC RM OBSERVATION: CPT

## 2017-09-21 PROCEDURE — 85018 HEMOGLOBIN: CPT | Performed by: OBSTETRICS & GYNECOLOGY

## 2017-09-21 PROCEDURE — 36415 COLL VENOUS BLD VENIPUNCTURE: CPT | Performed by: OBSTETRICS & GYNECOLOGY

## 2017-09-21 PROCEDURE — 74011250637 HC RX REV CODE- 250/637: Performed by: OBSTETRICS & GYNECOLOGY

## 2017-09-21 RX ORDER — MEDROXYPROGESTERONE ACETATE 10 MG/1
10 TABLET ORAL DAILY
Status: DISCONTINUED | OUTPATIENT
Start: 2017-09-21 | End: 2017-09-22 | Stop reason: HOSPADM

## 2017-09-21 RX ORDER — ACETAMINOPHEN 325 MG/1
650 TABLET ORAL
Status: DISCONTINUED | OUTPATIENT
Start: 2017-09-21 | End: 2017-09-22 | Stop reason: HOSPADM

## 2017-09-21 RX ADMIN — FERROUS SULFATE TAB 325 MG (65 MG ELEMENTAL FE) 325 MG: 325 (65 FE) TAB at 16:53

## 2017-09-21 RX ADMIN — WATER 25 MG: 1 INJECTION INTRAMUSCULAR; INTRAVENOUS; SUBCUTANEOUS at 16:53

## 2017-09-21 RX ADMIN — LEVOTHYROXINE SODIUM 75 MCG: 75 TABLET ORAL at 07:34

## 2017-09-21 RX ADMIN — MEDROXYPROGESTERONE ACETATE 10 MG: 10 TABLET ORAL at 21:50

## 2017-09-21 RX ADMIN — ACETAMINOPHEN 650 MG: 325 TABLET, FILM COATED ORAL at 09:57

## 2017-09-21 RX ADMIN — FERROUS SULFATE TAB 325 MG (65 MG ELEMENTAL FE) 325 MG: 325 (65 FE) TAB at 07:34

## 2017-09-21 RX ADMIN — OXYCODONE HYDROCHLORIDE AND ACETAMINOPHEN 500 MG: 500 TABLET ORAL at 07:34

## 2017-09-21 RX ADMIN — WATER 25 MG: 1 INJECTION INTRAMUSCULAR; INTRAVENOUS; SUBCUTANEOUS at 04:31

## 2017-09-21 RX ADMIN — WATER 25 MG: 1 INJECTION INTRAMUSCULAR; INTRAVENOUS; SUBCUTANEOUS at 09:57

## 2017-09-21 RX ADMIN — OXYCODONE HYDROCHLORIDE AND ACETAMINOPHEN 500 MG: 500 TABLET ORAL at 16:53

## 2017-09-21 NOTE — PROGRESS NOTES
Assessment done via doc flow sheet. Pt resting in bed, alert & oriented x3, resp easy & regular, lungs sounds clear to auscultation. Pt denies any vaginal bleeding at this time, pt instructed to report excessive vaginal bleeding, pt verbalized understanding. Bed low & locked, side rails x3 up with call light within reach, pt instructed to call for assistance as needed.

## 2017-09-21 NOTE — PROGRESS NOTES
Patient has done well this shift. No complaints of pain and has only had to change joaquín pad one time this shift. Good urinary output. Will continue to monitor.

## 2017-09-21 NOTE — PROGRESS NOTES
Problem: Falls - Risk of  Goal: *Absence of Falls  Document Saul Fall Risk and appropriate interventions in the flowsheet.    Outcome: Progressing Towards Goal  Fall Risk Interventions:              Medication Interventions: Patient to call before getting OOB, Teach patient to arise slowly

## 2017-09-21 NOTE — PROGRESS NOTES
Assessment completed and documented. Lung sounds noted to be coarse. No complaints of pain. Urine having less clots and is now a pink/light red color. Patient is up ad-edwin and alert and oriented. No present needs at this time. Will continue to monitor.

## 2017-09-22 VITALS
TEMPERATURE: 97.8 F | RESPIRATION RATE: 18 BRPM | DIASTOLIC BLOOD PRESSURE: 67 MMHG | SYSTOLIC BLOOD PRESSURE: 148 MMHG | WEIGHT: 254 LBS | HEART RATE: 89 BPM | HEIGHT: 63 IN | BODY MASS INDEX: 45 KG/M2 | OXYGEN SATURATION: 98 %

## 2017-09-22 LAB
BLD PROD TYP BPU: NORMAL
BLD PROD TYP BPU: NORMAL
BPU ID: NORMAL
BPU ID: NORMAL
ERYTHROCYTE [DISTWIDTH] IN BLOOD BY AUTOMATED COUNT: 14.1 % (ref 11.9–14.6)
HCT VFR BLD AUTO: 27 % (ref 35.8–46.3)
HGB BLD-MCNC: 8.6 G/DL (ref 11.7–15.4)
MCH RBC QN AUTO: 29.1 PG (ref 26.1–32.9)
MCHC RBC AUTO-ENTMCNC: 31.9 G/DL (ref 31.4–35)
MCV RBC AUTO: 91.2 FL (ref 79.6–97.8)
PLATELET # BLD AUTO: 280 K/UL (ref 150–450)
PMV BLD AUTO: 10.4 FL (ref 10.8–14.1)
RBC # BLD AUTO: 2.96 M/UL (ref 4.05–5.25)
STATUS OF UNIT,%ST: NORMAL
STATUS OF UNIT,%ST: NORMAL
UNIT DIVISION, %UDIV: 0
UNIT DIVISION, %UDIV: NORMAL
WBC # BLD AUTO: 10.1 K/UL (ref 4.3–11.1)

## 2017-09-22 PROCEDURE — 74011250637 HC RX REV CODE- 250/637: Performed by: OBSTETRICS & GYNECOLOGY

## 2017-09-22 PROCEDURE — 85027 COMPLETE CBC AUTOMATED: CPT | Performed by: OBSTETRICS & GYNECOLOGY

## 2017-09-22 PROCEDURE — 99218 HC RM OBSERVATION: CPT

## 2017-09-22 PROCEDURE — 36415 COLL VENOUS BLD VENIPUNCTURE: CPT | Performed by: OBSTETRICS & GYNECOLOGY

## 2017-09-22 RX ORDER — LEVOTHYROXINE SODIUM 75 UG/1
75 TABLET ORAL
Qty: 30 TAB | Refills: 12 | Status: SHIPPED | OUTPATIENT
Start: 2017-09-22 | End: 2020-07-27 | Stop reason: DRUGHIGH

## 2017-09-22 RX ORDER — MEDROXYPROGESTERONE ACETATE 10 MG/1
10 TABLET ORAL DAILY
Qty: 30 TAB | Refills: 3 | Status: SHIPPED | OUTPATIENT
Start: 2017-09-22 | End: 2017-12-18

## 2017-09-22 RX ORDER — LANOLIN ALCOHOL/MO/W.PET/CERES
325 CREAM (GRAM) TOPICAL 2 TIMES DAILY WITH MEALS
Qty: 60 TAB | Refills: 6 | Status: SHIPPED | OUTPATIENT
Start: 2017-09-22 | End: 2018-02-06 | Stop reason: CLARIF

## 2017-09-22 RX ADMIN — ESTROGENS, CONJUGATED 2.5 MG: 0.62 TABLET, FILM COATED ORAL at 00:48

## 2017-09-22 RX ADMIN — FERROUS SULFATE TAB 325 MG (65 MG ELEMENTAL FE) 325 MG: 325 (65 FE) TAB at 08:20

## 2017-09-22 RX ADMIN — ESTROGENS, CONJUGATED 2.5 MG: 0.62 TABLET, FILM COATED ORAL at 09:36

## 2017-09-22 RX ADMIN — OXYCODONE HYDROCHLORIDE AND ACETAMINOPHEN 500 MG: 500 TABLET ORAL at 08:20

## 2017-09-22 RX ADMIN — LEVOTHYROXINE SODIUM 75 MCG: 75 TABLET ORAL at 08:20

## 2017-09-22 RX ADMIN — ESTROGENS, CONJUGATED 2.5 MG: 0.62 TABLET, FILM COATED ORAL at 13:00

## 2017-09-22 NOTE — PROGRESS NOTES
Gynecology Progress Note        Hospital day 2. Reports the bleeding had slowed considerably today but recently picked up in the past 1.5 hours. Vitals:  Blood pressure 114/74, pulse 82, temperature 98.3 °F (36.8 °C), resp. rate 20, height 5' 3\" (1.6 m), weight 254 lb (115.2 kg), last menstrual period 2017, SpO2 97 %. Temp (24hrs), Av.4 °F (36.3 °C), Min:96.5 °F (35.8 °C), Max:98.3 °F (36.8 °C)        Exam:  Patient without distress. Abdomen soft, nontender. .               Lower extremities are negative for swelling, cords, or tenderness. Lab/Data Review: All lab results for the last 24 hours reviewed. Assessment and Plan:    Menometrorrhagia, s/p 2 U PRBCs and 1 U FFP. Txd w/ premarin 25 mg iv. VB had slowed but has picked back up over the past 1.5 hours. Stop iv premarin (has had 9 doses), try po home regimen:  Premarin 2.5 mg qid and Provera 10 mg every day. Watch tonight. Hopefull can discharge home in the am if  Bleed   Responds to po dosing.

## 2017-09-22 NOTE — PROGRESS NOTES
D/c instructions reviewed w/ pt and her . They verbalized understanding of all instructions. IV removed. Prescriptions provided. Pt is going to pack up and will D/C home with her .

## 2017-09-22 NOTE — DISCHARGE INSTRUCTIONS
Abnormal Uterine Bleeding: Care Instructions  Your Care Instructions  Abnormal uterine bleeding (AUB) is irregular bleeding from the uterus that is longer or heavier than usual or does not occur at your regular time. Sometimes it is caused by changes in hormone levels. It can also be caused by growths in the uterus, such as fibroids or polyps. Sometimes a cause cannot be found. You may have heavy bleeding when you are not expecting your period. Your doctor may suggest a pregnancy test, if you think you are pregnant. Follow-up care is a key part of your treatment and safety. Be sure to make and go to all appointments, and call your doctor if you are having problems. It's also a good idea to know your test results and keep a list of the medicines you take. How can you care for yourself at home? · Be safe with medicines. Take pain medicines exactly as directed. ¨ If the doctor gave you a prescription medicine for pain, take it as prescribed. ¨ If you are not taking a prescription pain medicine, ask your doctor if you can take an over-the-counter medicine. · You may be low in iron because of blood loss. Eat a balanced diet that is high in iron and vitamin C. Foods rich in iron include red meat, shellfish, eggs, beans, and leafy green vegetables. Talk to your doctor about whether you need to take iron pills or a multivitamin. When should you call for help? Call 911 anytime you think you may need emergency care. For example, call if:  · You passed out (lost consciousness). Call your doctor now or seek immediate medical care if:  · You have sudden, severe pain in your belly or pelvis. · You have severe vaginal bleeding. You are soaking through your usual pads or tampons every hour for 2 or more hours. · You feel dizzy or lightheaded, or you feel like you may faint. Watch closely for changes in your health, and be sure to contact your doctor if:  · You have new belly or pelvic pain.   · You have a fever.  · Your bleeding gets worse or lasts longer than 1 week. · You think you may be pregnant. Where can you learn more? Go to http://tan-monalisa.info/. Enter K245 in the search box to learn more about \"Abnormal Uterine Bleeding: Care Instructions. \"  Current as of: October 13, 2016  Content Version: 11.3  © 2332-9442 [employer]. Care instructions adapted under license by [employer] (which disclaims liability or warranty for this information). If you have questions about a medical condition or this instruction, always ask your healthcare professional. [place] [address] any warranty or liability for your use of this information. DISCHARGE SUMMARY from Nurse    The following personal items are in your possession at time of discharge:                   Clothing: At bedside                PATIENT INSTRUCTIONS:    Assessment and Plan:  D/c home on this regimen. Office 1-2wks. D/w pt and partner that this does not provide BC, and she should not get pregnant right now. D/w with them that this adnexal mass needs to be addressed. After general anesthesia or intravenous sedation, for 24 hours or while taking prescription Narcotics:  · Limit your activities  · Do not drive and operate hazardous machinery  · Do not make important personal or business decisions  · Do  not drink alcoholic beverages  · If you have not urinated within 8 hours after discharge, please contact your surgeon on call. Report the following to your surgeon:  · Excessive pain, swelling, redness or odor of or around the surgical area  · Temperature over 100.5  · Nausea and vomiting lasting longer than 4 hours or if unable to take medications  · Any signs of decreased circulation or nerve impairment to extremity: change in color, persistent  numbness, tingling, coldness or increase pain  · Any questions        What to do at Home:  Recommended activity: Activity as tolerated.     If you experience any of the following symptoms increase in bleeding, dizziness/chest pain, please follow up with MD.      *  Please give a list of your current medications to your Primary Care Provider. *  Please update this list whenever your medications are discontinued, doses are      changed, or new medications (including over-the-counter products) are added. *  Please carry medication information at all times in case of emergency situations. These are general instructions for a healthy lifestyle:    No smoking/ No tobacco products/ Avoid exposure to second hand smoke    Surgeon General's Warning:  Quitting smoking now greatly reduces serious risk to your health. Obesity, smoking, and sedentary lifestyle greatly increases your risk for illness    A healthy diet, regular physical exercise & weight monitoring are important for maintaining a healthy lifestyle    You may be retaining fluid if you have a history of heart failure or if you experience any of the following symptoms:  Weight gain of 3 pounds or more overnight or 5 pounds in a week, increased swelling in our hands or feet or shortness of breath while lying flat in bed. Please call your doctor as soon as you notice any of these symptoms; do not wait until your next office visit. Recognize signs and symptoms of STROKE:    F-face looks uneven    A-arms unable to move or move unevenly    S-speech slurred or non-existent    T-time-call 911 as soon as signs and symptoms begin-DO NOT go       Back to bed or wait to see if you get better-TIME IS BRAIN. Warning Signs of HEART ATTACK     Call 911 if you have these symptoms:   Chest discomfort. Most heart attacks involve discomfort in the center of the chest that lasts more than a few minutes, or that goes away and comes back. It can feel like uncomfortable pressure, squeezing, fullness, or pain.  Discomfort in other areas of the upper body.  Symptoms can include pain or discomfort in one or both arms, the back, neck, jaw, or stomach.  Shortness of breath with or without chest discomfort.  Other signs may include breaking out in a cold sweat, nausea, or lightheadedness. Don't wait more than five minutes to call 911 - MINUTES MATTER! Fast action can save your life. Calling 911 is almost always the fastest way to get lifesaving treatment. Emergency Medical Services staff can begin treatment when they arrive -- up to an hour sooner than if someone gets to the hospital by car. The discharge information has been reviewed with the patient. The patient verbalized understanding. Discharge medications reviewed with the patient and appropriate educational materials and side effects teaching were provided. Hand-Washing: Care Instructions  Your Care Instructions  It is important for caregivers to wash their hands properly. This is the single best way to prevent the spread of infections. Hand-washing can help keep you from getting sick. It is easy, doesn't cost much, and it works. Make sure that you and your caregivers follow safe hand-washing routines. Caregivers may include health care workers or family members at home or in a care facility. You can talk to them about this information on hand-washing. Follow-up care is a key part of your treatment and safety. Be sure to make and go to all appointments, and call your doctor if you are having problems. It's also a good idea to know your test results and keep a list of the medicines you take. How can you care for yourself at home? · Caregivers should wash their hands with soap and water:  ¨ When their hands are dirty, especially after being exposed to body fluids. This includes blood. ¨ When their hands may have been exposed to germs that could spread infection. ¨ After they touch broken skin, sores, or wound bandages. ¨ After they use the bathroom.   · At other times, caregivers can use an alcohol-based gel  or soap and water to clean hands. This should be done:  ¨ Before and after any contact with you. ¨ After they take off gloves. ¨ Before they handle a device that touches your body (even if gloves are used). ¨ After they touch any objects near you, such as medical equipment, lights, or doorknobs. ¨ Before they handle medicine or prepare food. Proper hand-washing for caregivers  · When using an alcohol-based gel , fill your palm with the gel. Then spread it all over your hands. Rub your hands together until they are dry. · When washing hands with soap and water:  ¨ Get your hands wet. Then use enough soap to cover your whole hands. ¨ Rub your hands together hard, in a [place]. Be sure that you cover all surfaces. Rub your wrists, the backs of your hands, between your fingers, and under your fingernails. Wash your hands for at least 30 seconds. ¨ Rinse your hands with water. But don't use hot water. It may irritate your hands. ¨ Use a paper towel to hold the faucet handle when you turn off the water. ¨ Dry your hands well with a paper towel. Don't use towels that have been used by others or used more than once. · If you use bar soap, use small bars. Set the soap on a rack that lets water drain. Where can you learn more? Go to http://tan-monalisa.info/. Enter L751 in the search box to learn more about \"Hand-Washing: Care Instructions. \"  Current as of: March 3, 2017  Content Version: 11.3  © 1664-2739 [employer]. Care instructions adapted under license by [employer] (which disclaims liability or warranty for this information). If you have questions about a medical condition or this instruction, always ask your healthcare professional. [place] [address] any warranty or liability for your use of this information.          Secondhand Smoke: Care Instructions  Your Care Instructions  Secondhand smoke comes from the burning end of a cigarette, cigar, or pipe and the smoke that a smoker exhales. The smoke contains nicotine and many other harmful chemicals. Breathing secondhand smoke can cause or worsen health problems including cancer, asthma, coronary artery disease, and respiratory infections. It can make your eyes and nose burn and cause a sore throat. Secondhand smoke is especially bad for babies and young children whose lungs are still developing. Babies whose parents smoke are more likely to have ear infections, pneumonia, and bronchitis in the first few years of their lives. Secondhand smoke can make asthma symptoms worse in children. If you are pregnant, it is important that you not smoke and that you avoid secondhand smoke. You are more likely to give birth to a baby who weighs less than expected (low birth weight) if you smoke. And your baby may have a greater risk for sudden infant death syndrome (SIDS). Babies whose mothers are exposed to secondhand smoke during pregnancy have a higher risk for health problems. Follow-up care is a key part of your treatment and safety. Be sure to make and go to all appointments, and call your doctor if you are having problems. It's also a good idea to know your test results and keep a list of the medicines you take. How can you care for yourself at home? · Do not smoke or let anyone else smoke in your home. If people must smoke, ask them to go outside. · If people do smoke in your home, choose a room where you can open a window or use a fan to get the smoke outside. · Do not let anyone smoke in your car. If someone must smoke, pull over in a safe place and let him or her smoke away from the car. · Ask your employer to make sure that you have a smoke-free work area. · Make sure that your children are not exposed to secondhand smoke at day care, school, and after-school programs. · Try to choose nonsmoking bars, restaurants, and other public places when you go out.   · Help your family and friends who smoke to quit by encouraging them to try. Tell them about treatment resources. Having support from others often helps. · If you smoke, quit. Quitting is hard, but there are ways to boost your chance of quitting tobacco for good. ¨ Use nicotine gum, patches, or lozenges. Call a quitline. Ask your doctor about stop-smoking programs and medicines. ¨ Keep trying. When should you call for help? Watch closely for changes in your health, and be sure to contact your doctor if you have any problems. Where can you learn more? Go to http://tan-monalisa.info/. Enter L004 in the search box to learn more about \"Secondhand Smoke: Care Instructions. \"  Current as of: March 20, 2017  Content Version: 11.3  © 4127-3262 [employer]. Care instructions adapted under license by [employer] (which disclaims liability or warranty for this information). If you have questions about a medical condition or this instruction, always ask your healthcare professional. [place] [address] any warranty or liability for your use of this information.

## 2017-09-22 NOTE — PROGRESS NOTES
OBG/GYN Generic Progress Note    Patient doing well. Bldg well controlled with this po regimen of hormones    Vitals:  Blood pressure 148/67, pulse 89, temperature 97.8 °F (36.6 °C), resp. rate 18, height 5' 3\" (1.6 m), weight 254 lb (115.2 kg), last menstrual period 2017, SpO2 98 %. Temp (24hrs), Av.8 °F (36.6 °C), Min:96.9 °F (36.1 °C), Max:98.3 °F (36.8 °C)        Exam:  Patient without distress. Abdomen soft,  nontender. Obese. Labs:   Recent Results (from the past 24 hour(s))   CBC W/O DIFF    Collection Time: 17 10:28 AM   Result Value Ref Range    WBC 10.1 4.3 - 11.1 K/uL    RBC 2.96 (L) 4.05 - 5.25 M/uL    HGB 8.6 (L) 11.7 - 15.4 g/dL    HCT 27.0 (L) 35.8 - 46.3 %    MCV 91.2 79.6 - 97.8 FL    MCH 29.1 26.1 - 32.9 PG    MCHC 31.9 31.4 - 35.0 g/dL    RDW 14.1 11.9 - 14.6 %    PLATELET 482 848 - 790 K/uL    MPV 10.4 (L) 10.8 - 14.1 FL       Assessment and Plan:  D/c home on this regimen. Office 1-2wks. D/w pt and partner that this does not provide BC, and she should not get pregnant right now. D/w with them that this adnexal mass needs to be addressed.

## 2017-09-22 NOTE — DISCHARGE SUMMARY
Gynecology Discharge Summary     Name: Jacob Yates MRN: [id number]  SSN: [id number]    YOB: 1985  Age: 28 y.o. Sex: female      Admit Date: 9/19/2017    Discharge Date: 9/22/2017      Admitting Physician: Marie Bowens MD     Discharge Physician: Bertram Maxwell MD     * Admission Diagnoses: Anemia;Menometrorrhagia    * Discharge Diagnoses:   Hospital Problems as of 9/22/2017  Date Reviewed: 9/19/2017          Codes Class Noted - Resolved POA    Ovarian cyst ICD-10-CM: N83.209  ICD-9-CM: 620.2  9/20/2017 - Present Unknown        Menometrorrhagia ICD-10-CM: N92.1  ICD-9-CM: 626.2  9/19/2017 - Present Unknown        * (Principal)Acute blood loss anemia ICD-10-CM: D62  ICD-9-CM: 285.1  9/19/2017 - Present Unknown        Morbid obesity ([facility] [address] ICD-10-CM: E66.01  ICD-9-CM: 278.01  9/19/2017 - Present Unknown        Acquired hypothyroidism ICD-10-CM: E03.9  ICD-9-CM: 244.9  9/19/2017 - Present Unknown               * Procedures: transfusion    Consults: None    * Discharge Condition: improved    * Hospital Course:   Stabilized with hi dose estrogen and daily provera    * Discharge Disposition: Home    Discharge Medications:   Current Discharge Medication List      START taking these medications    Details   conjugated estrogens (PREMARIN) 1.25 mg tablet Take 2 Tabs by mouth four (4) times daily. Qty: 120 Tab, Refills: 1      ferrous sulfate 325 mg (65 mg iron) tablet Take 1 Tab by mouth two (2) times daily (with meals). Qty: 60 Tab, Refills: 6      medroxyPROGESTERone (PROVERA) 10 mg tablet Take 1 Tab by mouth daily. Qty: 30 Tab, Refills: 3      levothyroxine (SYNTHROID) 75 mcg tablet Take 1 Tab by mouth Daily (before breakfast).   Qty: 30 Tab, Refills: 12         STOP taking these medications       norgestimate-ethinyl estradiol ([facility] [place] [address],) 0.25-35 mg-mcg tab Comments:   Reason for Stopping:         OTHER Comments:   Reason for Stopping:                * Follow-up Care/Patient Instructions: Activity: No sex, douching, or tampons for 6 weeks or as directed by your physician. No heavy lifting for 6 weeks. No driving while taking pain medication.   Diet: Resume pre-hospital diet  Wound Care: None needed    Follow-up Information     Follow up With Details Comments Mehran Fontenot MD   [facility] [address] [place] [address]  [phone]            Signed By:  Devan García MD     September 22, 2017

## 2017-09-22 NOTE — PROGRESS NOTES
AM assessment completed. Pt alert and oriented x4. Respirations unlabored. Lung sounds clear, on room air. Pt awake resting in bed w/  at bedside. Reports vaginal bleeding has slowed back down. Agrees to report pad count to RN. Pt denies dizziness, SOB, or pain. All safety measures in place. Encouraged pt to call w/ needs.

## 2017-09-23 LAB
ABO + RH BLD: NORMAL
BLD PROD TYP BPU: NORMAL
BLOOD GROUP ANTIBODIES SERPL: NORMAL
BPU ID: NORMAL
CROSSMATCH RESULT,%XM: NORMAL
SPECIMEN EXP DATE BLD: NORMAL
STATUS OF UNIT,%ST: NORMAL
UNIT DIVISION, %UDIV: 0

## 2017-10-30 PROBLEM — K35.32 RUPTURED APPENDICITIS: Status: ACTIVE | Noted: 2017-10-30

## 2017-11-09 PROBLEM — N60.19 DIFFUSE CYSTIC MASTOPATHY: Status: ACTIVE | Noted: 2017-11-09

## 2017-11-09 PROBLEM — N30.10 IC (INTERSTITIAL CYSTITIS): Status: ACTIVE | Noted: 2017-11-09

## 2017-12-11 NOTE — H&P
CC:  Preop Counseling      HPI:    Torie Whitlock  is a 33 y.o. female, [de-identified] , who is seen today for Preop visit for planned Hysteroscopy D&C (possible polypectomy), with Laparoscopic Left Ovarian Cystectomy, possible Left salpingo-oophorectomy, possible laparotomy due to HMB/IMB-O causing severe anemia and large left ovarian cyst.   DECLINES Mirena IUD as she wants to conceive in the near future. See previous notes for complete details. In short, pt initially seen by me in the hospital earlier this year after admission for menometrorrhagia and severe anemia. She was transferred from [facility] on 9/19/17 due to hgb 5.8 and continued VB x 11wks. Long hx of PCOS w/ assoc annovulatory VB.  TVUS in hosp (9/20/17):   COMPARISON: CT abdomen and pelvis March 28, 2014   FINDINGS: The uterus is 7.7 cm in length with an endometrial stripe thickness  measuring 10 mm. A large simple left pelvic cyst measures 16.9 x 9.1 x 16 cm. This is similar in appearance to the comparison 2014 CT.   The right ovary is 3.5 x 2.3 x 2.6 cm. The left ovary is 3 x 1.4 x 3.1 cm and contains a 1.5 cm simple cyst.   IMPRESSION:  1) EMS 10 mm. 2) Large chronic cyst in the left hemipelvis measuring 16.9 cm in length. Previous GYN pt of Dr. Bonita Leos in [facility]. States she had forgotten about her previous dx of large ovarian cyst found initially on 3/28/2014 on CT. Reports Dr. Bonita Leos just wanted to monitor but she didn't have FU for it. Denies any pain. CT read:  \"large cyst in the upper left pelvis, 11.0 x 5.4 cm. The cyst probably originating from the left ovary\"      She is s/p 3uPRBCs, 1uFFP, IV estrogen w/ transition to PO Premarin 2.5mg 4x/day w/ Provera 10mg every day while inpt. EMB outpatient (10/4/17) c/w Progestin effect, and neg for hyperplasia or malignancy. Premarin and Provera DC'd and changed to OCPs (10/4/17).            Repeat TVUS at [facility] (10/26/17)  Uterus 95mL  EMS 6.9mm, irregular   Lt ovary: 4.3mL; 2 cysts   1) small, collapsed cyst (1.5 x 0.7cm)   2) large, paraovarian--medial and posterior to Left ovary--appears simple throughout w/ good posterior enhancement, 15.2 x 13 x 9.3cm)  Rt ovary: 7.7mL  No FF        Since last visit, she reports compliance w/ continuous (acitve only) OCPs, FeSO4/Vit C, and Synthroid. Has had 1 additional period since last visit, but not heavy. Denies any significant changes since I saw her last.        GYN HISTORY:  As per HPI    Sexual History:  has sex with males, , monogamous, no hx STDs  Contraception:  Abstinence, OCPs   Last Pap:  Summer 2016 at [facility] per pt  Abnl Paps:  None   Mammo:  Na     Current Outpatient Prescriptions on File Prior to Visit   Medication Sig Dispense Refill    ondansetron hcl (ZOFRAN) 8 mg tablet Take 1 Tab by mouth every eight (8) hours as needed for Nausea. 30 Tab 0    norgestimate-ethinyl estradiol (ORTHO-CYCLEN, SPRINTEC) 0.25-35 mg-mcg tab Take 1 Tab by mouth daily. 1 Package 3    conjugated estrogens (PREMARIN) 1.25 mg tablet Take 2 Tabs by mouth four (4) times daily. 120 Tab 1    ferrous sulfate 325 mg (65 mg iron) tablet Take 1 Tab by mouth two (2) times daily (with meals). 60 Tab 6    medroxyPROGESTERone (PROVERA) 10 mg tablet Take 1 Tab by mouth daily. 30 Tab 3    levothyroxine (SYNTHROID) 75 mcg tablet Take 1 Tab by mouth Daily (before breakfast). 30 Tab 12     No current facility-administered medications on file prior to visit.           Past Medical History:   Diagnosis Date    Hypothyroid     Knee pain 12/10/2012    Morbid obesity (HCC)     BMI 39.9    PCOS (polycystic ovarian syndrome) 12/10/2012    Ruptured appendix     s/p open appy         Past Surgical History:   Procedure Laterality Date    HX APPENDECTOMY  2002    ruptured, performed open    HX CHOLECYSTECTOMY  04/11/2014    laparoscopic, [facility]          Outpatient Encounter Prescriptions as of 12/4/2017   Medication Sig Dispense Refill    ondansetron hcl (ZOFRAN) 8 mg tablet Take 1 Tab by mouth every eight (8) hours as needed for Nausea. 30 Tab 0    norgestimate-ethinyl estradiol (ORTHO-CYCLEN, SPRINTEC) 0.25-35 mg-mcg tab Take 1 Tab by mouth daily. 1 Package 3    conjugated estrogens (PREMARIN) 1.25 mg tablet Take 2 Tabs by mouth four (4) times daily. 120 Tab 1    ferrous sulfate 325 mg (65 mg iron) tablet Take 1 Tab by mouth two (2) times daily (with meals). 60 Tab 6    medroxyPROGESTERone (PROVERA) 10 mg tablet Take 1 Tab by mouth daily. 30 Tab 3    levothyroxine (SYNTHROID) 75 mcg tablet Take 1 Tab by mouth Daily (before breakfast). 30 Tab 12     No facility-administered encounter medications on file as of 12/4/2017. No Known Allergies      Family History   Problem Relation Age of Onset    Seizures Sister     Diabetes Father     Hypertension Father     Cancer Maternal Grandmother      lung cancer    Cancer Maternal Grandfather     Cancer Paternal Grandfather      cirrhosis; colon cancer         Social History     Social History    Marital status:      Spouse name: N/A    Number of children: N/A    Years of education: N/A     Social History Main Topics    Smoking status: Never Smoker    Smokeless tobacco: Never Used    Alcohol use No    Drug use: No    Sexual activity: Yes     Partners: Male     Birth control/ protection: Condom     Other Topics Concern    Not on file     Social History Narrative           ROS:  Review of Systems   Constitutional: Negative for chills, fever and weight loss. +weight loss   HENT: Negative for hearing loss. Eyes: Negative for blurred vision and double vision. Respiratory: Negative for cough, hemoptysis and shortness of breath. Cardiovascular: Negative for chest pain, palpitations and orthopnea. Gastrointestinal: Negative for abdominal pain, blood in stool, constipation, diarrhea, nausea and vomiting. Genitourinary: Negative for dysuria, frequency, hematuria and urgency. Musculoskeletal: Negative for falls, joint pain and myalgias. Skin: Negative for itching and rash. Neurological: Negative for headaches. Endo/Heme/Allergies: Does not bruise/bleed easily. Psychiatric/Behavioral: Negative for depression and suicidal ideas. The patient is not nervous/anxious. All other systems reviewed and are negative. PHYSICAL EXAM:     Visit Vitals    /82 (BP 1 Location: Left arm, BP Patient Position: Sitting)    Wt 253 lb 3.2 oz (114.9 kg)    BMI 44.85 kg/m2       Constitutional: She appears well-developed and well-nourished. No distress. Morbidly obese  HENT:    Head: Normocephalic and atraumatic. Cardiovascular: Normal rate, regular rhythm and normal heart sounds. Exam reveals no gallop and no friction rub.    No murmur heard. Pulmonary/Chest: Effort normal and breath sounds normal. No respiratory distress. She has no wheezes. She has no rales. Abdominal: Soft. Bowel sounds are normal. She exhibits no distension and no mass. There is no tenderness. There is no rebound and no guarding. Skin: She is not diaphoretic. Psychiatric: She has a normal mood and affect. Her behavior is normal. Thought content normal. .    Pelvic:   External genitalia wnl, no lesions, rashes  Clitoris and urethra midline  Vagina pink, moist, well rugated, scant dark blood in vaginal vault   Cervix without lesion/masses, DC wnl, no CMT  Uterus/adnexa unable to appreciate size due to body habitus, NTTP       Counseling:  Discussed how the actual planned surgery would be performed as well as the potential  risks of surgery including bleeding, infection, DVT, risks of surgical injuries to internal organs including but not limited to the bowels, bladder, rectum, and female reproductive organs. The patient understands the risks, any and all questions were answered to the patient's satisfaction.        Discussed her previous ruptured appy and OP Note from her later [facility] (4/11/14) at [address] Xuan Marinelli w/ Dr. Lary Connor reviewed --he entered sub umbilically under direct visualization. She was noted to have \"inflammatory adhesions to the inferior aspect of the gallbladder were taken down. There were a lot of adhesions from inflammation which had to be dissected. Careful and tedious dissection was performed to free up the cystic duct and artery from the surrounding tissues\". .. \" I turned the scope to the LLQ and we could see no ovarian cyst, although there was a lot of adipose and some gaseous distention of the sigmoid colon blocking visual access. I did not attempt to retract any pelvic tissues. \"  Discussed w/ pt how adhesions can add to the complexity and sometimes safety of the proposed procedure. Her weight also can increase surgical risks. Patient counseled face to face for more than 50% of the total time spent with the patient.   Time=30  minutes         ASSESSMENT/PLAN:  29 yo G0 w/ HMB/IMB-O causing severe anemia, large ovarian cyst, morbid obesity:    1) HMB/IMB-O:   -see counseling note   - Hysteroscopy D&C (possible polypectomy), with Laparoscopic Left Ovarian Cystectomy, possible Left salpingo-oophorectomy, possible laparotomy   -continue Sprintec OCPs until surgery      2) Large Ovarian Cyst:   -posting for surgery as above    3) Morbid Obesity:   -declines bariatric referral   -continue weight loss efforts   -EKG 9/19/17 sinus tach, otherwise wnl    4) Anemia:    -FeSO4/Vit C BID    -colace/Miralax PRN         Tosha Aviles MD

## 2017-12-12 ENCOUNTER — HOSPITAL ENCOUNTER (OUTPATIENT)
Dept: SURGERY | Age: 32
Discharge: HOME | End: 2017-12-12

## 2017-12-14 VITALS — WEIGHT: 240 LBS | BODY MASS INDEX: 42.52 KG/M2 | HEIGHT: 63 IN

## 2017-12-14 NOTE — PERIOP NOTES
Patient verified name, , and surgery as listed in [facility]. Type 2 surgery, phone assessment complete. Orders in EMR- received. Labs per surgeon: none  Labs per anesthesia protocol: hgb needed- Pt instructed to go to outpatient lab at Entrance B for blood draw Mon-Fri [id number] prior to day of surgery. Pt verbalizes understanding. Order placed in EMR on hold for arrival.    Patient answered medical/surgical history questions at their best of ability. All prior to admission medications documented in [facility]. Patient instructed to take the following medications the day of surgery according to anesthesia guidelines with a small sip of water: synthroid. Hold all vitamins 7 days prior to surgery and NSAIDS 5 days prior to surgery. Medications to be held- none. Patient instructed on the following:  Arrive at A Entrance, time of arrival to be called the day before by 1700  NPO after midnight including gum, mints, and ice chips  Responsible adult must drive patient to the hospital, stay during surgery, and patient will need supervision 24 hours after anesthesia  Use antibacterial soap in shower the night before surgery and on the morning of surgery  Leave all valuables (money and jewelry) at home but bring insurance card and ID on  DOS  Do not wear make-up, nail polish, lotions, cologne, perfumes, powders, or oil on skin. Patient teach back successful and patient demonstrates knowledge of instruction.

## 2017-12-17 ENCOUNTER — ANESTHESIA EVENT (OUTPATIENT)
Dept: SURGERY | Age: 32
End: 2017-12-17
Payer: COMMERCIAL

## 2017-12-18 ENCOUNTER — HOSPITAL ENCOUNTER (OUTPATIENT)
Age: 32
Setting detail: OUTPATIENT SURGERY
Discharge: HOME | End: 2017-12-18
Attending: OBSTETRICS & GYNECOLOGY | Admitting: OBSTETRICS & GYNECOLOGY
Payer: COMMERCIAL

## 2017-12-18 ENCOUNTER — ANESTHESIA (OUTPATIENT)
Dept: SURGERY | Age: 32
End: 2017-12-18
Payer: COMMERCIAL

## 2017-12-18 VITALS
RESPIRATION RATE: 15 BRPM | SYSTOLIC BLOOD PRESSURE: 114 MMHG | OXYGEN SATURATION: 99 % | TEMPERATURE: 98.7 F | DIASTOLIC BLOOD PRESSURE: 60 MMHG | BODY MASS INDEX: 44.15 KG/M2 | HEART RATE: 77 BPM | WEIGHT: 249.25 LBS

## 2017-12-18 PROBLEM — K86.3 PANCREATIC PSEUDOCYST/CYST: Status: ACTIVE | Noted: 2017-12-18

## 2017-12-18 PROBLEM — K86.2 PANCREATIC PSEUDOCYST/CYST: Status: ACTIVE | Noted: 2017-12-18

## 2017-12-18 PROBLEM — N83.209 OVARIAN CYST: Status: RESOLVED | Noted: 2017-09-20 | Resolved: 2017-12-18

## 2017-12-18 LAB
HCG UR QL: NEGATIVE
HGB BLD-MCNC: 11.2 G/DL (ref 11.7–15.4)

## 2017-12-18 PROCEDURE — 77030019927 HC TBNG IRR CYSTO BAXT -A: Performed by: OBSTETRICS & GYNECOLOGY

## 2017-12-18 PROCEDURE — 74011000250 HC RX REV CODE- 250: Performed by: ANESTHESIOLOGY

## 2017-12-18 PROCEDURE — 77030017963 HC PRB COAG1 CNMD -C: Performed by: OBSTETRICS & GYNECOLOGY

## 2017-12-18 PROCEDURE — 88112 CYTOPATH CELL ENHANCE TECH: CPT | Performed by: OBSTETRICS & GYNECOLOGY

## 2017-12-18 PROCEDURE — 77030034849: Performed by: OBSTETRICS & GYNECOLOGY

## 2017-12-18 PROCEDURE — 77030008703 HC TU ET UNCUF COVD -A: Performed by: ANESTHESIOLOGY

## 2017-12-18 PROCEDURE — 77030020782 HC GWN BAIR PAWS FLX 3M -B: Performed by: ANESTHESIOLOGY

## 2017-12-18 PROCEDURE — 74011250636 HC RX REV CODE- 250/636: Performed by: ANESTHESIOLOGY

## 2017-12-18 PROCEDURE — 77030035047 HC TRCR ENDOSC VRSPRT BLDLSS LNG COVD -C: Performed by: OBSTETRICS & GYNECOLOGY

## 2017-12-18 PROCEDURE — 76210000021 HC REC RM PH II 0.5 TO 1 HR: Performed by: OBSTETRICS & GYNECOLOGY

## 2017-12-18 PROCEDURE — 77030002888 HC SUT CHRMC J&J -A: Performed by: OBSTETRICS & GYNECOLOGY

## 2017-12-18 PROCEDURE — 77030035220 HC TRCR ENDOSC BLNTPRT ANCHR COVD -B: Performed by: OBSTETRICS & GYNECOLOGY

## 2017-12-18 PROCEDURE — 77030008477 HC STYL SATN SLP COVD -A: Performed by: ANESTHESIOLOGY

## 2017-12-18 PROCEDURE — 77030011502 HC MANIP UTER ZUM ZINN -B: Performed by: OBSTETRICS & GYNECOLOGY

## 2017-12-18 PROCEDURE — 77030035029 HC NDL INSUF VERES DISP COVD -B: Performed by: OBSTETRICS & GYNECOLOGY

## 2017-12-18 PROCEDURE — 77030008522 HC TBNG INSUF LAPRO STRY -B: Performed by: OBSTETRICS & GYNECOLOGY

## 2017-12-18 PROCEDURE — 77030031139 HC SUT VCRL2 J&J -A: Performed by: OBSTETRICS & GYNECOLOGY

## 2017-12-18 PROCEDURE — 77030035048 HC TRCR ENDOSC OPTCL COVD -B: Performed by: OBSTETRICS & GYNECOLOGY

## 2017-12-18 PROCEDURE — 88305 TISSUE EXAM BY PATHOLOGIST: CPT | Performed by: OBSTETRICS & GYNECOLOGY

## 2017-12-18 PROCEDURE — 77030018836 HC SOL IRR NACL ICUM -A: Performed by: OBSTETRICS & GYNECOLOGY

## 2017-12-18 PROCEDURE — 85018 HEMOGLOBIN: CPT | Performed by: ANESTHESIOLOGY

## 2017-12-18 PROCEDURE — 77030002933 HC SUT MCRYL J&J -A: Performed by: OBSTETRICS & GYNECOLOGY

## 2017-12-18 PROCEDURE — 76010000131 HC OR TIME 2 TO 2.5 HR: Performed by: OBSTETRICS & GYNECOLOGY

## 2017-12-18 PROCEDURE — 76060000035 HC ANESTHESIA 2 TO 2.5 HR: Performed by: OBSTETRICS & GYNECOLOGY

## 2017-12-18 PROCEDURE — 74011000250 HC RX REV CODE- 250

## 2017-12-18 PROCEDURE — 77030032490 HC SLV COMPR SCD KNE COVD -B: Performed by: OBSTETRICS & GYNECOLOGY

## 2017-12-18 PROCEDURE — 74011250636 HC RX REV CODE- 250/636

## 2017-12-18 PROCEDURE — 77030012317 HC CATH URET INT COVD -A: Performed by: OBSTETRICS & GYNECOLOGY

## 2017-12-18 PROCEDURE — 77030000038 HC TIP SCIS LAPSCP SURI -B: Performed by: OBSTETRICS & GYNECOLOGY

## 2017-12-18 PROCEDURE — 77030035051: Performed by: OBSTETRICS & GYNECOLOGY

## 2017-12-18 PROCEDURE — 77030011640 HC PAD GRND REM COVD -A: Performed by: OBSTETRICS & GYNECOLOGY

## 2017-12-18 PROCEDURE — 81025 URINE PREGNANCY TEST: CPT

## 2017-12-18 PROCEDURE — 77030010507 HC ADH SKN DERMBND J&J -B: Performed by: OBSTETRICS & GYNECOLOGY

## 2017-12-18 PROCEDURE — 77030008756 HC TU IRR SUC STRY -B: Performed by: OBSTETRICS & GYNECOLOGY

## 2017-12-18 PROCEDURE — 76210000016 HC OR PH I REC 1 TO 1.5 HR: Performed by: OBSTETRICS & GYNECOLOGY

## 2017-12-18 RX ORDER — FENTANYL CITRATE 50 UG/ML
INJECTION, SOLUTION INTRAMUSCULAR; INTRAVENOUS AS NEEDED
Status: DISCONTINUED | OUTPATIENT
Start: 2017-12-18 | End: 2017-12-18 | Stop reason: HOSPADM

## 2017-12-18 RX ORDER — MORPHINE SULFATE 10 MG/ML
5 INJECTION, SOLUTION INTRAMUSCULAR; INTRAVENOUS
Status: DISCONTINUED | OUTPATIENT
Start: 2017-12-18 | End: 2017-12-18 | Stop reason: HOSPADM

## 2017-12-18 RX ORDER — SODIUM CHLORIDE 0.9 % (FLUSH) 0.9 %
5-10 SYRINGE (ML) INJECTION AS NEEDED
Status: DISCONTINUED | OUTPATIENT
Start: 2017-12-18 | End: 2017-12-18 | Stop reason: HOSPADM

## 2017-12-18 RX ORDER — SODIUM CHLORIDE 0.9 % (FLUSH) 0.9 %
5-10 SYRINGE (ML) INJECTION EVERY 8 HOURS
Status: DISCONTINUED | OUTPATIENT
Start: 2017-12-18 | End: 2017-12-18 | Stop reason: HOSPADM

## 2017-12-18 RX ORDER — MIDAZOLAM HYDROCHLORIDE 1 MG/ML
2 INJECTION, SOLUTION INTRAMUSCULAR; INTRAVENOUS
Status: DISCONTINUED | OUTPATIENT
Start: 2017-12-18 | End: 2017-12-18 | Stop reason: HOSPADM

## 2017-12-18 RX ORDER — GLYCOPYRROLATE 0.2 MG/ML
INJECTION INTRAMUSCULAR; INTRAVENOUS AS NEEDED
Status: DISCONTINUED | OUTPATIENT
Start: 2017-12-18 | End: 2017-12-18 | Stop reason: HOSPADM

## 2017-12-18 RX ORDER — IBUPROFEN 800 MG/1
800 TABLET ORAL
Qty: 90 TAB | Refills: 3 | Status: SHIPPED | OUTPATIENT
Start: 2017-12-18 | End: 2018-02-06 | Stop reason: CLARIF

## 2017-12-18 RX ORDER — NALOXONE HYDROCHLORIDE 0.4 MG/ML
0.4 INJECTION, SOLUTION INTRAMUSCULAR; INTRAVENOUS; SUBCUTANEOUS AS NEEDED
Status: DISCONTINUED | OUTPATIENT
Start: 2017-12-18 | End: 2017-12-18 | Stop reason: HOSPADM

## 2017-12-18 RX ORDER — HYDROCODONE BITARTRATE AND ACETAMINOPHEN 10; 325 MG/1; MG/1
1 TABLET ORAL
Qty: 30 TAB | Refills: 0 | Status: SHIPPED | OUTPATIENT
Start: 2017-12-18 | End: 2018-02-06 | Stop reason: CLARIF

## 2017-12-18 RX ORDER — NALOXONE HYDROCHLORIDE 0.4 MG/ML
0.1 INJECTION, SOLUTION INTRAMUSCULAR; INTRAVENOUS; SUBCUTANEOUS
Status: DISCONTINUED | OUTPATIENT
Start: 2017-12-18 | End: 2017-12-18 | Stop reason: HOSPADM

## 2017-12-18 RX ORDER — OXYCODONE HYDROCHLORIDE 5 MG/1
5 TABLET ORAL
Status: DISCONTINUED | OUTPATIENT
Start: 2017-12-18 | End: 2017-12-18 | Stop reason: HOSPADM

## 2017-12-18 RX ORDER — FLUMAZENIL 0.1 MG/ML
0.2 INJECTION INTRAVENOUS
Status: DISCONTINUED | OUTPATIENT
Start: 2017-12-18 | End: 2017-12-18 | Stop reason: HOSPADM

## 2017-12-18 RX ORDER — ONDANSETRON 2 MG/ML
INJECTION INTRAMUSCULAR; INTRAVENOUS AS NEEDED
Status: DISCONTINUED | OUTPATIENT
Start: 2017-12-18 | End: 2017-12-18 | Stop reason: HOSPADM

## 2017-12-18 RX ORDER — LIDOCAINE HYDROCHLORIDE 10 MG/ML
0.1 INJECTION INFILTRATION; PERINEURAL AS NEEDED
Status: DISCONTINUED | OUTPATIENT
Start: 2017-12-18 | End: 2017-12-18 | Stop reason: HOSPADM

## 2017-12-18 RX ORDER — ROCURONIUM BROMIDE 10 MG/ML
INJECTION, SOLUTION INTRAVENOUS AS NEEDED
Status: DISCONTINUED | OUTPATIENT
Start: 2017-12-18 | End: 2017-12-18 | Stop reason: HOSPADM

## 2017-12-18 RX ORDER — LIDOCAINE HYDROCHLORIDE 20 MG/ML
INJECTION, SOLUTION EPIDURAL; INFILTRATION; INTRACAUDAL; PERINEURAL AS NEEDED
Status: DISCONTINUED | OUTPATIENT
Start: 2017-12-18 | End: 2017-12-18 | Stop reason: HOSPADM

## 2017-12-18 RX ORDER — NEOSTIGMINE METHYLSULFATE 1 MG/ML
INJECTION INTRAVENOUS AS NEEDED
Status: DISCONTINUED | OUTPATIENT
Start: 2017-12-18 | End: 2017-12-18 | Stop reason: HOSPADM

## 2017-12-18 RX ORDER — IBUPROFEN 400 MG/1
400 TABLET ORAL
Status: DISCONTINUED | OUTPATIENT
Start: 2017-12-18 | End: 2017-12-18 | Stop reason: HOSPADM

## 2017-12-18 RX ORDER — SODIUM CHLORIDE, SODIUM LACTATE, POTASSIUM CHLORIDE, CALCIUM CHLORIDE 600; 310; 30; 20 MG/100ML; MG/100ML; MG/100ML; MG/100ML
100 INJECTION, SOLUTION INTRAVENOUS CONTINUOUS
Status: DISCONTINUED | OUTPATIENT
Start: 2017-12-18 | End: 2017-12-18 | Stop reason: HOSPADM

## 2017-12-18 RX ORDER — HYDROCODONE BITARTRATE AND ACETAMINOPHEN 7.5; 325 MG/1; MG/1
1 TABLET ORAL
Status: DISCONTINUED | OUTPATIENT
Start: 2017-12-18 | End: 2017-12-18 | Stop reason: HOSPADM

## 2017-12-18 RX ORDER — HYDROMORPHONE HYDROCHLORIDE 2 MG/ML
0.5 INJECTION, SOLUTION INTRAMUSCULAR; INTRAVENOUS; SUBCUTANEOUS
Status: DISCONTINUED | OUTPATIENT
Start: 2017-12-18 | End: 2017-12-18 | Stop reason: HOSPADM

## 2017-12-18 RX ORDER — PROPOFOL 10 MG/ML
INJECTION, EMULSION INTRAVENOUS AS NEEDED
Status: DISCONTINUED | OUTPATIENT
Start: 2017-12-18 | End: 2017-12-18 | Stop reason: HOSPADM

## 2017-12-18 RX ORDER — KETOROLAC TROMETHAMINE 30 MG/ML
INJECTION, SOLUTION INTRAMUSCULAR; INTRAVENOUS AS NEEDED
Status: DISCONTINUED | OUTPATIENT
Start: 2017-12-18 | End: 2017-12-18 | Stop reason: HOSPADM

## 2017-12-18 RX ORDER — NALBUPHINE HYDROCHLORIDE 10 MG/ML
5 INJECTION, SOLUTION INTRAMUSCULAR; INTRAVENOUS; SUBCUTANEOUS
Status: DISCONTINUED | OUTPATIENT
Start: 2017-12-18 | End: 2017-12-18 | Stop reason: HOSPADM

## 2017-12-18 RX ORDER — DEXTROSE, SODIUM CHLORIDE, SODIUM LACTATE, POTASSIUM CHLORIDE, AND CALCIUM CHLORIDE 5; .6; .31; .03; .02 G/100ML; G/100ML; G/100ML; G/100ML; G/100ML
125 INJECTION, SOLUTION INTRAVENOUS CONTINUOUS
Status: DISCONTINUED | OUTPATIENT
Start: 2017-12-18 | End: 2017-12-18 | Stop reason: HOSPADM

## 2017-12-18 RX ORDER — DEXAMETHASONE SODIUM PHOSPHATE 4 MG/ML
INJECTION, SOLUTION INTRA-ARTICULAR; INTRALESIONAL; INTRAMUSCULAR; INTRAVENOUS; SOFT TISSUE AS NEEDED
Status: DISCONTINUED | OUTPATIENT
Start: 2017-12-18 | End: 2017-12-18 | Stop reason: HOSPADM

## 2017-12-18 RX ADMIN — LIDOCAINE HYDROCHLORIDE 100 MG: 20 INJECTION, SOLUTION EPIDURAL; INFILTRATION; INTRACAUDAL; PERINEURAL at 11:44

## 2017-12-18 RX ADMIN — PROPOFOL 200 MG: 10 INJECTION, EMULSION INTRAVENOUS at 11:44

## 2017-12-18 RX ADMIN — GLYCOPYRROLATE 0.6 MG: 0.2 INJECTION INTRAMUSCULAR; INTRAVENOUS at 13:31

## 2017-12-18 RX ADMIN — FAMOTIDINE 20 MG: 10 INJECTION, SOLUTION INTRAVENOUS at 10:40

## 2017-12-18 RX ADMIN — ROCURONIUM BROMIDE 50 MG: 10 INJECTION, SOLUTION INTRAVENOUS at 11:44

## 2017-12-18 RX ADMIN — SODIUM CHLORIDE, SODIUM LACTATE, POTASSIUM CHLORIDE, AND CALCIUM CHLORIDE: 600; 310; 30; 20 INJECTION, SOLUTION INTRAVENOUS at 13:41

## 2017-12-18 RX ADMIN — ROCURONIUM BROMIDE 10 MG: 10 INJECTION, SOLUTION INTRAVENOUS at 13:03

## 2017-12-18 RX ADMIN — FENTANYL CITRATE 100 MCG: 50 INJECTION, SOLUTION INTRAMUSCULAR; INTRAVENOUS at 12:24

## 2017-12-18 RX ADMIN — ONDANSETRON 4 MG: 2 INJECTION INTRAMUSCULAR; INTRAVENOUS at 13:22

## 2017-12-18 RX ADMIN — HYDROMORPHONE HYDROCHLORIDE 0.5 MG: 2 INJECTION, SOLUTION INTRAMUSCULAR; INTRAVENOUS; SUBCUTANEOUS at 14:03

## 2017-12-18 RX ADMIN — MIDAZOLAM HYDROCHLORIDE 2 MG: 1 INJECTION, SOLUTION INTRAMUSCULAR; INTRAVENOUS at 11:32

## 2017-12-18 RX ADMIN — KETOROLAC TROMETHAMINE 30 MG: 30 INJECTION, SOLUTION INTRAMUSCULAR; INTRAVENOUS at 13:22

## 2017-12-18 RX ADMIN — SODIUM CHLORIDE, SODIUM LACTATE, POTASSIUM CHLORIDE, AND CALCIUM CHLORIDE 100 ML/HR: 600; 310; 30; 20 INJECTION, SOLUTION INTRAVENOUS at 10:40

## 2017-12-18 RX ADMIN — FENTANYL CITRATE 100 MCG: 50 INJECTION, SOLUTION INTRAMUSCULAR; INTRAVENOUS at 11:44

## 2017-12-18 RX ADMIN — LIDOCAINE HYDROCHLORIDE 0.1 ML: 10 INJECTION, SOLUTION INFILTRATION; PERINEURAL at 10:40

## 2017-12-18 RX ADMIN — SODIUM CHLORIDE, SODIUM LACTATE, POTASSIUM CHLORIDE, AND CALCIUM CHLORIDE: 600; 310; 30; 20 INJECTION, SOLUTION INTRAVENOUS at 12:29

## 2017-12-18 RX ADMIN — DEXAMETHASONE SODIUM PHOSPHATE 10 MG: 4 INJECTION, SOLUTION INTRA-ARTICULAR; INTRALESIONAL; INTRAMUSCULAR; INTRAVENOUS; SOFT TISSUE at 11:55

## 2017-12-18 RX ADMIN — NEOSTIGMINE METHYLSULFATE 4 MG: 1 INJECTION INTRAVENOUS at 13:31

## 2017-12-18 NOTE — IP AVS SNAPSHOT
Keisha Paula 
 
 
 [address] [place], [place] [id number] 
[phone] Patient: Ashish Arcos MRN: [id number] QKD:[id number] About your hospitalization You were admitted on:  December 18, 2017 You last received care in the:  [facility] PACU You were discharged on:  December 18, 2017 Why you were hospitalized Your primary diagnosis was:  Not on File Your diagnoses also included:  Pancreatic Pseudocyst/Cyst  
  
Things You Need To Do (next 8 weeks) Follow up with Qasim Diaz MD  
  
Phone:  [phone] Where:  [address] Schedule an appointment with Pj Gomes MD as soon as possible for a visit in 10 day(s) Phone:  [phone] [facility] [id number] Tuesday Jan 02, 2018 [facility] with Pj Gomes MD at  9:30 AM  
Where:  [facility] Thursday Jan 11, 2018 GYNUS Plus Physician with Estephania Munguia [id number] at  3:30 PM  
Where:  [facility] G[facility] Physician with Pj Gomes MD at  4:15 PM  
[facility]:  [facility] Discharge Orders None A check malgorzata indicates which time of day the medication should be taken. My Medications STOP taking these medications   
 conjugated estrogens 1.25 mg tablet Commonly known as:  PREMARIN  
   
  
 medroxyPROGESTERone 10 mg tablet Commonly known as:  PROVERA TAKE these medications as instructed Instructions Each Dose to Equal  
 Morning Noon Evening Bedtime  
 ferrous sulfate 325 mg (65 mg iron) tablet Your last dose was: Your next dose is: Take 1 Tab by mouth two (2) times daily (with meals). 325 mg HYDROcodone-acetaminophen  mg tablet Commonly known as:  Martha Fothergill  
   
 Your last dose was: Your next dose is: Take 1 Tab by mouth every six (6) hours as needed for Pain. Max Daily Amount: 4 Tabs. 1 Tab  
    
   
   
   
  
 ibuprofen 800 mg tablet Commonly known as:  MOTRIN Your last dose was: Your next dose is: Take 1 Tab by mouth every eight (8) hours as needed for Pain. 800 mg  
    
   
   
   
  
 levothyroxine 75 mcg tablet Commonly known as:  SYNTHROID Your last dose was: Your next dose is: Take 1 Tab by mouth Daily (before breakfast). 75 mcg  
    
   
   
   
  
 norgestimate-ethinyl estradiol 0.25-35 mg-mcg Tab Commonly known as:  Roise Copping Your last dose was: Your next dose is: Take 1 Tab by mouth daily. 1 Tab  
    
   
   
   
  
 ondansetron hcl 8 mg tablet Commonly known as:  Esha Peres Your last dose was: Your next dose is: Take 1 Tab by mouth every eight (8) hours as needed for Nausea. 8 mg Where to Get Your Medications These medications were sent to [facility]/pharmacy #[id number], [place] [id number] [place] [address] Phone:  [phone]  
  ibuprofen 800 mg tablet Information on where to get these meds will be given to you by the nurse or doctor. ! Ask your nurse or doctor about these medications HYDROcodone-acetaminophen  mg tablet Discharge Instructions INSTRUCTIONS FOLLOWING HYSTEROSCOPY 
 
ACTIVITY  Limited activity today; increase as tolerated tomorrow, but no vigorous exercise  Shower only; no tub baths  No douches, tampons or intercourse until your doctor releases you (at least 2 weeks)  May return to work or school as directed by your doctor DIET  Clear liquids until no nausea or vomiting  Advance to regular diet as tolerated PAIN 
  Expect a moderate amount of pain.  Take pain medication as directed by your doctor. If no prescription for pain is sent     
   home with you, take the appropriate dose of your commonly used pain medication.  Call your doctor if pain is NOT relieved by medication.  DO NOT take aspirin or blood thinners until directed by your doctor. FOLLOW-UP PHONE CALLS  Calls will be made by nursing staff  If you have any problems, call your doctor as needed. CALL YOUR DOCTOR IF 
 Excessive bleeding that soaks a pad in an hour  Temperature of 101°F or above  Green or yellow, smelly discharge  Unable to urinate by bedtime  Nausea and vomiting that does not stop by bedtime AFTER ANESTHESIA  For the first 24 hours: DO NOT Drive, Drink alcoholic beverages, or Make important decisions.  Beware of dizziness following anesthesia and while taking pain medication.  Plan to stay tonight within 1 hours drive of the hospital 
 
 
 
 
 
  
  
  
Introducing [address]! Dear Susie Soto: Thank you for requesting a [employer] account. Our records indicate that you already have an active [employer] account. You can access your account anytime at [email or web address] Did you know that you can access your hospital and ER discharge instructions at any time in [employer]? You can also review all of your test results from your hospital stay or ER visit. Additional Information If you have questions, please visit the Frequently Asked Questions section of the [employer] website at [email or web address] Remember, [employer] is NOT to be used for urgent needs. For medical emergencies, dial 911. Now available from your iPhone and Android! Providers Seen During Your Hospitalization Provider Specialty Primary office phone Monroe Scales MD Obstetrics & Gynecology [phone] Your Primary Care Physician (PCP) Primary Care Physician Office Phone Office Fax Guichobhavinruth Fontenot [phone] You are allergic to the following No active allergies Recent Documentation Weight BMI OB Status Smoking Status 113.1 kg 44.15 kg/m2 Having regular periods Never Smoker Emergency Contacts Name Discharge Info Relation Home Work Mobile Uyen Zimmerman  Spouse [3] [phone] Sirena Burt  Parent [1] [phone] Patient Belongings The following personal items are in your possession at time of discharge: 
                             
 
  
  
 Please provide this summary of care documentation to your next provider. Signatures-by signing, you are acknowledging that this After Visit Summary has been reviewed with you and you have received a copy. Patient Signature:  ____________________________________________________________ Date:  ____________________________________________________________  
  
Sariah Culver Provider Signature:  ____________________________________________________________ Date:  ____________________________________________________________

## 2017-12-18 NOTE — ANESTHESIA PREPROCEDURE EVALUATION
Anesthetic History   No history of anesthetic complications            Review of Systems / Medical History  Patient summary reviewed and pertinent labs reviewed    Pulmonary  Within defined limits                 Neuro/Psych   Within defined limits           Cardiovascular                  Exercise tolerance: >4 METS     GI/Hepatic/Renal     GERD           Endo/Other        Morbid obesity     Other Findings            Physical Exam    Airway  Mallampati: II  TM Distance: 4 - 6 cm  Neck ROM: normal range of motion   Mouth opening: Normal     Cardiovascular    Rhythm: regular  Rate: normal         Dental  No notable dental hx       Pulmonary  Breath sounds clear to auscultation               Abdominal  GI exam deferred       Other Findings            Anesthetic Plan    ASA: 3  Anesthesia type: general          Induction: Intravenous  Anesthetic plan and risks discussed with: Patient

## 2017-12-18 NOTE — OP NOTES
FULL OP NOTE    Patient ID:      Name: ISAURA May Record Number: [id number]    YOB: 1985    DATE OF PROCEDURE:  12/18/2017    PREOPERATIVE DIAGNOSIS:  28yo G0 w/ HMB/IMB-O causing severe anemia and large left ovarian cyst, morbid obesity     POSTOPERATIVE DIAGNOSIS:  Endometriosis, large Mesenteric cyst vs Pancreatic pseudocyst    PROCEDURE:   Hysteroscopy, dilatation & curettage, diagnostic laparoscopy, pelvic washings, peritoneal bxs and fulguration of endometriosis, intraop general surgery consult via phone call, KITTY     SURGEON:  Jean Ruiz MD    ASSISTANT:  Ryne Gonzalez MD    ANESTHESIA: general    COMPLICATIONS:  None     DRAINS:  Urinary Catheter (Ge)    PATHOLOGY:  Endometrial curettings, peritoneal bx, pelvic washings     ANTIBIOTICS:  None     EBL: 5 cc    FINDINGS:    Hysteroscopy:  Areas of fluffy endometrium, no polyp, no submucsal fibroid     Diagnostic LSC:   Vesicular endometriosis covering fundus of uterus and endometriosis on left uterosacral ligament. NO ovarian cyst present, but large Mesenteric cyst vs Pancreatic pseudocyst per general surgery along the left pelvic sidewall, extending from close to left ovary to mid abdomen to LUQ. Uterus, tubes, ovaries otherwise wnl. SPECIMENS: Endometrial curettings, peritoneal bx of presumed endometriosis, pelvic washings       PROCEDURE IN DETAIL: The patient was placed on the Operating Room table in the supine position. The patient underwent general endotracheal anesthesia and was repositioned in the dorsal lithotomy position, prepped and draped in the usual sterile fashion for vaginal surgery. Ge catheter placed. Time out was done to confirm the operating procedure, surgeon, patient and site. Once confirmed by the team, procedure was started. The cervix was exposed with a weighted vaginal speculum and grasped with a tenaculum.  The uterus was sounded to 8 cm and the cervix dilated to [facility]. Diagnostic hysteroscope was introduced into the endometrial cavity using saline. The findings were noted as above. . A thorough endometrial curettage was performed with a serrated currette. Repeat curettage was performed as needed following reinspection of the uterine cavity with a hysteroscope using saline as a distention median was accomplished without problems. The endometrial cavity appeared normal following the curettage. The hysteroscope was then removed and a uterine manipulator was placed w/out difficulty. The tenaculum was removed from the cervix. Attention then turned to pt's abdomen. A 5mm incision made infraumbilically. Veress needle placed in the incision and pneumoperitoneum was created with an opening pressure of 14 mmHg. The Veress needle was then removed and re-entry attempted x2 w/out success. Decision made to try LUQ placement. OG dropped and 5mm incision made in [place] point. Attempted Veress needle again, but when pneumoperitoneum was created with an opening pressure of 13 mmHg. Re-attempted but still w/ high opening pressure. Decision made to proceed with supraumbilical open entry w/ Ayad Ply. 12mm incision made supraumbilically; fascia identified w/ aid of S retractors and pranav clamps. Grasped w/ Kocher clamps x2 and using scissors entered sharply. Fascia was then tagged on both sides w/ 0 Vicryl. Peritoneum then grasped w/ hemostats and entered sharply. Tang 12mm trocar then placed and  pneumoperitoneum was created w/out difficulty. There was no bleeding and no evidence of injury to the bowel below insertion site. Findings noted as above. Pt placed in Trendelenburg. A 5 mm blunt trocar was placed in the right lower quadrant under direct visualization. Blunt probe used to help hold the cystic mass out of the way for placement of the 2nd 5mm trocar in the pt's LLQ w/out difficulty. Pelvic washings then performed and sent to path.     Careful inspection of what had been thought to have been a large left ovarian cyst proved to be not connected. Intraop general surgery consult made. Unable to have MD come to the case in person but findings were discussed over the phone and diff dx were likely large Mesenteric cyst vs Pancreatic pseudocyst.    Pelvis further inspected and findings were noted as above. Peritoneal bx of presumed vesicular endometriosis performed along w/ fulguration of endometriosis noted over pt's left uterosacral ligament w/ energy applied to a maryland grasper. KITTY from previous appy vs lonnie performed w/out difficulty. No bleeding noted throughout case. The pneumoperitoneum was reduced and the left and right lower trocars were removed under direct visualization. Once the pneumoperitoneum was completely reduced, the final umbilical trocar was removed. Supraumbilical incision's fascia closed w/ 0 Vicryl. Skin of all incisions was closed with 4-0 Vicryl. Dermabond was applied to the skin. All sponge, lap, needle, and instrument counts were correct times two. Subsequently, the patient was cleaned up, returned to the supine position, awakened, and taken to the recovery room in stable condition. Findings discussed in depth with pt's .         Waldemar Ayala MD

## 2017-12-18 NOTE — ANESTHESIA POSTPROCEDURE EVALUATION
Post-Anesthesia Evaluation and Assessment    Patient: Venita Hernandez MRN: [id number]  SSN: [id number]    YOB: 1985  Age: 33 y.o. Sex: female       Cardiovascular Function/Vital Signs  Visit Vitals    /60    Pulse 77    Temp 37.1 °C (98.7 °F)    Resp 15    Wt 113.1 kg (249 lb 4 oz)    SpO2 99%    BMI 44.15 kg/m2       Patient is status post general anesthesia for Procedure(s):  DILATATION AND CURETTAGE/HYSTEROSCOPY/PELVIC WASHINGS  DIAG LAP/LYSIS OF ADHESION/PERITONEAL BX/FULGURATION OF ENDOMETRIOSIS/ INTRAOP GENERAL SURGERY CONSULT. Nausea/Vomiting: None    Postoperative hydration reviewed and adequate. Pain:  Pain Scale 1: Numeric (0 - 10) (12/18/17 1433)  Pain Intensity 1: 0 (12/18/17 1433)   Managed    Neurological Status:   Neuro (WDL): Within Defined Limits (12/18/17 1433)   At baseline    Mental Status and Level of Consciousness: Arousable    Pulmonary Status:   O2 Device: Nasal cannula (12/18/17 1353)   Adequate oxygenation and airway patent    Complications related to anesthesia: None    Post-anesthesia assessment completed.  No concerns    Signed By: Vicky Flores MD     December 18, 2017

## 2018-01-04 ENCOUNTER — HOSPITAL ENCOUNTER (OUTPATIENT)
Dept: CT IMAGING | Age: 33
Discharge: HOME | End: 2018-01-04
Attending: SURGERY
Payer: COMMERCIAL

## 2018-01-04 VITALS — HEIGHT: 63 IN | WEIGHT: 245 LBS | BODY MASS INDEX: 43.41 KG/M2

## 2018-01-04 DIAGNOSIS — K86.3 PANCREATIC PSEUDOCYST/CYST: ICD-10-CM

## 2018-01-04 DIAGNOSIS — K86.2 PANCREATIC PSEUDOCYST/CYST: ICD-10-CM

## 2018-01-04 PROCEDURE — 74177 CT ABD & PELVIS W/CONTRAST: CPT

## 2018-01-04 PROCEDURE — 74011000258 HC RX REV CODE- 258: Performed by: SURGERY

## 2018-01-04 PROCEDURE — 74011636320 HC RX REV CODE- 636/320: Performed by: SURGERY

## 2018-01-04 RX ORDER — SODIUM CHLORIDE 0.9 % (FLUSH) 0.9 %
10 SYRINGE (ML) INJECTION
Status: COMPLETED | OUTPATIENT
Start: 2018-01-04 | End: 2018-01-04

## 2018-01-04 RX ADMIN — SODIUM CHLORIDE 100 ML: 900 INJECTION, SOLUTION INTRAVENOUS at 09:55

## 2018-01-04 RX ADMIN — DIATRIZOATE MEGLUMINE AND DIATRIZOATE SODIUM 15 ML: 660; 100 LIQUID ORAL; RECTAL at 09:55

## 2018-01-04 RX ADMIN — IOPAMIDOL 100 ML: 755 INJECTION, SOLUTION INTRAVENOUS at 09:55

## 2018-01-04 RX ADMIN — Medication 10 ML: at 09:55

## 2018-01-19 PROBLEM — K66.8 MESENTERIC CYST: Status: ACTIVE | Noted: 2018-01-19

## 2018-01-23 RX ORDER — CEFAZOLIN SODIUM IN 0.9 % NACL 2 G/50 ML
2 INTRAVENOUS SOLUTION, PIGGYBACK (ML) INTRAVENOUS ONCE
Status: CANCELLED | OUTPATIENT
Start: 2018-02-12 | End: 2018-02-12

## 2018-02-06 ENCOUNTER — HOSPITAL ENCOUNTER (OUTPATIENT)
Dept: SURGERY | Age: 33
Discharge: HOME | End: 2018-02-06
Payer: COMMERCIAL

## 2018-02-06 VITALS
TEMPERATURE: 98.4 F | DIASTOLIC BLOOD PRESSURE: 89 MMHG | HEIGHT: 63 IN | RESPIRATION RATE: 20 BRPM | BODY MASS INDEX: 42.17 KG/M2 | HEART RATE: 70 BPM | OXYGEN SATURATION: 100 % | SYSTOLIC BLOOD PRESSURE: 140 MMHG | WEIGHT: 238 LBS

## 2018-02-06 LAB
ALBUMIN SERPL-MCNC: 3.2 G/DL (ref 3.5–5)
ALBUMIN/GLOB SERPL: 0.9 {RATIO} (ref 1.2–3.5)
ALP SERPL-CCNC: 58 U/L (ref 50–136)
ALT SERPL-CCNC: 22 U/L (ref 12–65)
ANION GAP SERPL CALC-SCNC: 6 MMOL/L (ref 7–16)
APPEARANCE UR: CLEAR
APTT PPP: 28.6 SEC (ref 23.2–35.3)
AST SERPL-CCNC: 13 U/L (ref 15–37)
BACTERIA URNS QL MICRO: 0 /HPF
BILIRUB SERPL-MCNC: 0.3 MG/DL (ref 0.2–1.1)
BILIRUB UR QL: NEGATIVE
BUN SERPL-MCNC: 7 MG/DL (ref 6–23)
CALCIUM SERPL-MCNC: 9 MG/DL (ref 8.3–10.4)
CHLORIDE SERPL-SCNC: 108 MMOL/L (ref 98–107)
CO2 SERPL-SCNC: 28 MMOL/L (ref 21–32)
COLOR UR: YELLOW
CREAT SERPL-MCNC: 0.64 MG/DL (ref 0.6–1)
ERYTHROCYTE [DISTWIDTH] IN BLOOD BY AUTOMATED COUNT: 16.5 % (ref 11.9–14.6)
GLOBULIN SER CALC-MCNC: 3.7 G/DL (ref 2.3–3.5)
GLUCOSE SERPL-MCNC: 100 MG/DL (ref 65–100)
GLUCOSE UR STRIP.AUTO-MCNC: NEGATIVE MG/DL
HCT VFR BLD AUTO: 35.8 % (ref 35.8–46.3)
HGB BLD-MCNC: 11.4 G/DL (ref 11.7–15.4)
HGB UR QL STRIP: NEGATIVE
INR PPP: 1
KETONES UR QL STRIP.AUTO: NEGATIVE MG/DL
LEUKOCYTE ESTERASE UR QL STRIP.AUTO: NEGATIVE
MCH RBC QN AUTO: 26.2 PG (ref 26.1–32.9)
MCHC RBC AUTO-ENTMCNC: 31.8 G/DL (ref 31.4–35)
MCV RBC AUTO: 82.3 FL (ref 79.6–97.8)
NITRITE UR QL STRIP.AUTO: NEGATIVE
PH UR STRIP: 5.5 [PH] (ref 5–9)
PLATELET # BLD AUTO: 227 K/UL (ref 150–450)
PMV BLD AUTO: 10.3 FL (ref 10.8–14.1)
POTASSIUM SERPL-SCNC: 3.9 MMOL/L (ref 3.5–5.1)
PROT SERPL-MCNC: 6.9 G/DL (ref 6.3–8.2)
PROT UR STRIP-MCNC: NEGATIVE MG/DL
PROTHROMBIN TIME: 13.1 SEC (ref 11.5–14.5)
RBC # BLD AUTO: 4.35 M/UL (ref 4.05–5.25)
SODIUM SERPL-SCNC: 142 MMOL/L (ref 136–145)
SP GR UR REFRACTOMETRY: 1 (ref 1–1.02)
UROBILINOGEN UR QL STRIP.AUTO: 0.2 EU/DL (ref 0.2–1)
WBC # BLD AUTO: 5.3 K/UL (ref 4.3–11.1)

## 2018-02-06 PROCEDURE — 81003 URINALYSIS AUTO W/O SCOPE: CPT | Performed by: SURGERY

## 2018-02-06 PROCEDURE — 85610 PROTHROMBIN TIME: CPT | Performed by: SURGERY

## 2018-02-06 PROCEDURE — 80053 COMPREHEN METABOLIC PANEL: CPT | Performed by: SURGERY

## 2018-02-06 PROCEDURE — 85730 THROMBOPLASTIN TIME PARTIAL: CPT | Performed by: SURGERY

## 2018-02-06 PROCEDURE — 85027 COMPLETE CBC AUTOMATED: CPT | Performed by: SURGERY

## 2018-02-06 RX ORDER — ESOMEPRAZOLE MAGNESIUM 40 MG/1
40 CAPSULE, DELAYED RELEASE ORAL DAILY
COMMUNITY
End: 2019-01-07

## 2018-02-06 NOTE — PERIOP NOTES
Patient verified name, , and surgery as listed in [facility]. Patient provided medical/health information and PTA medications to the best of their ability. TYPE  CASE:III  Orders per surgeon: Received and dated. Labs per surgeon: CBC, CMP, PT, PTT; UA  Labs per anesthesia protocol:  UHCG on day of surgery  EKG  :  NA    Patient provided with and instructed on education handouts including Guide to Surgery, blood transfusions, pain management, and hand hygiene for the family and community, and [facility] brochure. Preoperative and instructions given per hospital policy. Instructed patient to continue previous medications as prescribed prior to surgery unless otherwise directed and to take the following medications the day of surgery according to anesthesia guidelines : levothyroxine, nexium. Instructed patient to hold  the following medications: none. Original medication prescription bottles not visualized during patient appointment. Patient teach back successful and patient demonstrates knowledge of instruction.

## 2018-02-11 ENCOUNTER — ANESTHESIA EVENT (OUTPATIENT)
Dept: SURGERY | Age: 33
DRG: 356 | End: 2018-02-11
Payer: COMMERCIAL

## 2018-02-12 ENCOUNTER — HOSPITAL ENCOUNTER (INPATIENT)
Age: 33
LOS: 2 days | Discharge: HOME | DRG: 356 | End: 2018-02-14
Attending: SURGERY | Admitting: SURGERY
Payer: COMMERCIAL

## 2018-02-12 ENCOUNTER — ANESTHESIA (OUTPATIENT)
Dept: SURGERY | Age: 33
DRG: 356 | End: 2018-02-12
Payer: COMMERCIAL

## 2018-02-12 DIAGNOSIS — R19.00 RETROPERITONEAL MASS: Primary | ICD-10-CM

## 2018-02-12 LAB
ABO + RH BLD: NORMAL
BLOOD BANK CMNT PATIENT-IMP: NORMAL
BLOOD GROUP ANTIBODIES SERPL: NORMAL
HCG UR QL: NEGATIVE
SPECIMEN EXP DATE BLD: NORMAL

## 2018-02-12 PROCEDURE — 81025 URINE PREGNANCY TEST: CPT

## 2018-02-12 PROCEDURE — 74011250636 HC RX REV CODE- 250/636

## 2018-02-12 PROCEDURE — 77030002966 HC SUT PDS J&J -A: Performed by: SURGERY

## 2018-02-12 PROCEDURE — 65270000029 HC RM PRIVATE

## 2018-02-12 PROCEDURE — 77030019908 HC STETH ESOPH SIMS -A: Performed by: ANESTHESIOLOGY

## 2018-02-12 PROCEDURE — 88305 TISSUE EXAM BY PATHOLOGIST: CPT | Performed by: SURGERY

## 2018-02-12 PROCEDURE — 86901 BLOOD TYPING SEROLOGIC RH(D): CPT | Performed by: SURGERY

## 2018-02-12 PROCEDURE — 77030011264 HC ELECTRD BLD EXT COVD -A: Performed by: SURGERY

## 2018-02-12 PROCEDURE — 77030027138 HC INCENT SPIROMETER -A

## 2018-02-12 PROCEDURE — 77030008703 HC TU ET UNCUF COVD -A: Performed by: ANESTHESIOLOGY

## 2018-02-12 PROCEDURE — 74011250636 HC RX REV CODE- 250/636: Performed by: ANESTHESIOLOGY

## 2018-02-12 PROCEDURE — 76010000162 HC OR TIME 1.5 TO 2 HR INTENSV-TIER 1: Performed by: SURGERY

## 2018-02-12 PROCEDURE — 77030011640 HC PAD GRND REM COVD -A: Performed by: SURGERY

## 2018-02-12 PROCEDURE — 77030018836 HC SOL IRR NACL ICUM -A: Performed by: SURGERY

## 2018-02-12 PROCEDURE — 77030002996 HC SUT SLK J&J -A: Performed by: SURGERY

## 2018-02-12 PROCEDURE — 77030008477 HC STYL SATN SLP COVD -A: Performed by: ANESTHESIOLOGY

## 2018-02-12 PROCEDURE — 77030034850: Performed by: SURGERY

## 2018-02-12 PROCEDURE — 77030033269 HC SLV COMPR SCD KNE2 CARD -B: Performed by: SURGERY

## 2018-02-12 PROCEDURE — 76210000016 HC OR PH I REC 1 TO 1.5 HR: Performed by: SURGERY

## 2018-02-12 PROCEDURE — 77030020782 HC GWN BAIR PAWS FLX 3M -B: Performed by: ANESTHESIOLOGY

## 2018-02-12 PROCEDURE — 74011250637 HC RX REV CODE- 250/637: Performed by: ANESTHESIOLOGY

## 2018-02-12 PROCEDURE — 77030008467 HC STPLR SKN COVD -B: Performed by: SURGERY

## 2018-02-12 PROCEDURE — 74011250636 HC RX REV CODE- 250/636: Performed by: SURGERY

## 2018-02-12 PROCEDURE — 0UB60ZX EXCISION OF LEFT FALLOPIAN TUBE, OPEN APPROACH, DIAGNOSTIC: ICD-10-PCS | Performed by: SURGERY

## 2018-02-12 PROCEDURE — 74011250637 HC RX REV CODE- 250/637: Performed by: SURGERY

## 2018-02-12 PROCEDURE — 76060000034 HC ANESTHESIA 1.5 TO 2 HR: Performed by: SURGERY

## 2018-02-12 PROCEDURE — 74011000250 HC RX REV CODE- 250

## 2018-02-12 PROCEDURE — 77030020407 HC IV BLD WRMR ST 3M -A: Performed by: ANESTHESIOLOGY

## 2018-02-12 PROCEDURE — 77030008771 HC TU NG SALEM SUMP -A: Performed by: SURGERY

## 2018-02-12 PROCEDURE — 0WBH0ZZ EXCISION OF RETROPERITONEUM, OPEN APPROACH: ICD-10-PCS | Performed by: SURGERY

## 2018-02-12 RX ORDER — SODIUM CHLORIDE, SODIUM LACTATE, POTASSIUM CHLORIDE, CALCIUM CHLORIDE 600; 310; 30; 20 MG/100ML; MG/100ML; MG/100ML; MG/100ML
25 INJECTION, SOLUTION INTRAVENOUS CONTINUOUS
Status: DISCONTINUED | OUTPATIENT
Start: 2018-02-12 | End: 2018-02-14 | Stop reason: HOSPADM

## 2018-02-12 RX ORDER — ONDANSETRON 2 MG/ML
4 INJECTION INTRAMUSCULAR; INTRAVENOUS
Status: DISCONTINUED | OUTPATIENT
Start: 2018-02-12 | End: 2018-02-14 | Stop reason: HOSPADM

## 2018-02-12 RX ORDER — OXYCODONE HYDROCHLORIDE 5 MG/1
5 TABLET ORAL
Status: DISCONTINUED | OUTPATIENT
Start: 2018-02-12 | End: 2018-02-12 | Stop reason: HOSPADM

## 2018-02-12 RX ORDER — LIDOCAINE HYDROCHLORIDE 20 MG/ML
INJECTION, SOLUTION EPIDURAL; INFILTRATION; INTRACAUDAL; PERINEURAL AS NEEDED
Status: DISCONTINUED | OUTPATIENT
Start: 2018-02-12 | End: 2018-02-12 | Stop reason: HOSPADM

## 2018-02-12 RX ORDER — FENTANYL CITRATE 50 UG/ML
INJECTION, SOLUTION INTRAMUSCULAR; INTRAVENOUS AS NEEDED
Status: DISCONTINUED | OUTPATIENT
Start: 2018-02-12 | End: 2018-02-12 | Stop reason: HOSPADM

## 2018-02-12 RX ORDER — LIDOCAINE HYDROCHLORIDE 10 MG/ML
0.1 INJECTION INFILTRATION; PERINEURAL AS NEEDED
Status: DISCONTINUED | OUTPATIENT
Start: 2018-02-12 | End: 2018-02-12 | Stop reason: HOSPADM

## 2018-02-12 RX ORDER — CEFAZOLIN SODIUM/WATER 2 G/20 ML
2 SYRINGE (ML) INTRAVENOUS ONCE
Status: COMPLETED | OUTPATIENT
Start: 2018-02-12 | End: 2018-02-12

## 2018-02-12 RX ORDER — SODIUM CHLORIDE, SODIUM LACTATE, POTASSIUM CHLORIDE, CALCIUM CHLORIDE 600; 310; 30; 20 MG/100ML; MG/100ML; MG/100ML; MG/100ML
75 INJECTION, SOLUTION INTRAVENOUS CONTINUOUS
Status: DISCONTINUED | OUTPATIENT
Start: 2018-02-12 | End: 2018-02-12 | Stop reason: HOSPADM

## 2018-02-12 RX ORDER — NEOSTIGMINE METHYLSULFATE 1 MG/ML
INJECTION INTRAVENOUS AS NEEDED
Status: DISCONTINUED | OUTPATIENT
Start: 2018-02-12 | End: 2018-02-12 | Stop reason: HOSPADM

## 2018-02-12 RX ORDER — NALOXONE HYDROCHLORIDE 0.4 MG/ML
0.4 INJECTION, SOLUTION INTRAMUSCULAR; INTRAVENOUS; SUBCUTANEOUS AS NEEDED
Status: DISCONTINUED | OUTPATIENT
Start: 2018-02-12 | End: 2018-02-14 | Stop reason: HOSPADM

## 2018-02-12 RX ORDER — ENOXAPARIN SODIUM 100 MG/ML
40 INJECTION SUBCUTANEOUS EVERY 24 HOURS
Status: DISCONTINUED | OUTPATIENT
Start: 2018-02-12 | End: 2018-02-14 | Stop reason: HOSPADM

## 2018-02-12 RX ORDER — ONDANSETRON 2 MG/ML
INJECTION INTRAMUSCULAR; INTRAVENOUS AS NEEDED
Status: DISCONTINUED | OUTPATIENT
Start: 2018-02-12 | End: 2018-02-12 | Stop reason: HOSPADM

## 2018-02-12 RX ORDER — OXYCODONE HYDROCHLORIDE 5 MG/1
10 TABLET ORAL
Status: DISCONTINUED | OUTPATIENT
Start: 2018-02-12 | End: 2018-02-12 | Stop reason: HOSPADM

## 2018-02-12 RX ORDER — FAMOTIDINE 20 MG/1
20 TABLET, FILM COATED ORAL ONCE
Status: COMPLETED | OUTPATIENT
Start: 2018-02-12 | End: 2018-02-12

## 2018-02-12 RX ORDER — PANTOPRAZOLE SODIUM 40 MG/1
40 TABLET, DELAYED RELEASE ORAL
Status: DISCONTINUED | OUTPATIENT
Start: 2018-02-13 | End: 2018-02-14 | Stop reason: HOSPADM

## 2018-02-12 RX ORDER — SODIUM CHLORIDE 0.9 % (FLUSH) 0.9 %
5-10 SYRINGE (ML) INJECTION AS NEEDED
Status: DISCONTINUED | OUTPATIENT
Start: 2018-02-12 | End: 2018-02-14 | Stop reason: HOSPADM

## 2018-02-12 RX ORDER — PROPOFOL 10 MG/ML
INJECTION, EMULSION INTRAVENOUS AS NEEDED
Status: DISCONTINUED | OUTPATIENT
Start: 2018-02-12 | End: 2018-02-12 | Stop reason: HOSPADM

## 2018-02-12 RX ORDER — ROCURONIUM BROMIDE 10 MG/ML
INJECTION, SOLUTION INTRAVENOUS AS NEEDED
Status: DISCONTINUED | OUTPATIENT
Start: 2018-02-12 | End: 2018-02-12 | Stop reason: HOSPADM

## 2018-02-12 RX ORDER — HYDROMORPHONE HYDROCHLORIDE 2 MG/ML
0.5 INJECTION, SOLUTION INTRAMUSCULAR; INTRAVENOUS; SUBCUTANEOUS
Status: DISCONTINUED | OUTPATIENT
Start: 2018-02-12 | End: 2018-02-12 | Stop reason: HOSPADM

## 2018-02-12 RX ORDER — CEFAZOLIN SODIUM/WATER 2 G/20 ML
2 SYRINGE (ML) INTRAVENOUS EVERY 8 HOURS
Status: COMPLETED | OUTPATIENT
Start: 2018-02-12 | End: 2018-02-13

## 2018-02-12 RX ORDER — MORPHINE SULFATE 5 MG/ML
INJECTION, SOLUTION INTRAVENOUS CONTINUOUS
Status: DISCONTINUED | OUTPATIENT
Start: 2018-02-12 | End: 2018-02-14 | Stop reason: HOSPADM

## 2018-02-12 RX ORDER — SODIUM CHLORIDE 0.9 % (FLUSH) 0.9 %
5-10 SYRINGE (ML) INJECTION EVERY 8 HOURS
Status: DISCONTINUED | OUTPATIENT
Start: 2018-02-12 | End: 2018-02-14 | Stop reason: HOSPADM

## 2018-02-12 RX ORDER — GLYCOPYRROLATE 0.2 MG/ML
INJECTION INTRAMUSCULAR; INTRAVENOUS AS NEEDED
Status: DISCONTINUED | OUTPATIENT
Start: 2018-02-12 | End: 2018-02-12 | Stop reason: HOSPADM

## 2018-02-12 RX ORDER — DEXAMETHASONE SODIUM PHOSPHATE 4 MG/ML
INJECTION, SOLUTION INTRA-ARTICULAR; INTRALESIONAL; INTRAMUSCULAR; INTRAVENOUS; SOFT TISSUE AS NEEDED
Status: DISCONTINUED | OUTPATIENT
Start: 2018-02-12 | End: 2018-02-12 | Stop reason: HOSPADM

## 2018-02-12 RX ORDER — CEFAZOLIN SODIUM IN 0.9 % NACL 2 G/50 ML
2 INTRAVENOUS SOLUTION, PIGGYBACK (ML) INTRAVENOUS ONCE
Status: DISCONTINUED | OUTPATIENT
Start: 2018-02-12 | End: 2018-02-12 | Stop reason: SDUPTHER

## 2018-02-12 RX ORDER — DOCUSATE SODIUM 100 MG/1
100 CAPSULE, LIQUID FILLED ORAL 2 TIMES DAILY
Status: DISCONTINUED | OUTPATIENT
Start: 2018-02-12 | End: 2018-02-14 | Stop reason: HOSPADM

## 2018-02-12 RX ORDER — FENTANYL CITRATE 50 UG/ML
100 INJECTION, SOLUTION INTRAMUSCULAR; INTRAVENOUS ONCE
Status: DISCONTINUED | OUTPATIENT
Start: 2018-02-12 | End: 2018-02-12 | Stop reason: HOSPADM

## 2018-02-12 RX ORDER — LEVOTHYROXINE SODIUM 75 UG/1
75 TABLET ORAL
Status: DISCONTINUED | OUTPATIENT
Start: 2018-02-13 | End: 2018-02-14 | Stop reason: HOSPADM

## 2018-02-12 RX ORDER — MIDAZOLAM HYDROCHLORIDE 1 MG/ML
2 INJECTION, SOLUTION INTRAMUSCULAR; INTRAVENOUS ONCE
Status: DISCONTINUED | OUTPATIENT
Start: 2018-02-12 | End: 2018-02-12 | Stop reason: HOSPADM

## 2018-02-12 RX ADMIN — SODIUM CHLORIDE, SODIUM LACTATE, POTASSIUM CHLORIDE, AND CALCIUM CHLORIDE 75 ML/HR: 600; 310; 30; 20 INJECTION, SOLUTION INTRAVENOUS at 12:49

## 2018-02-12 RX ADMIN — FENTANYL CITRATE 25 MCG: 50 INJECTION, SOLUTION INTRAMUSCULAR; INTRAVENOUS at 08:42

## 2018-02-12 RX ADMIN — ENOXAPARIN SODIUM 40 MG: 40 INJECTION SUBCUTANEOUS at 17:23

## 2018-02-12 RX ADMIN — LIDOCAINE HYDROCHLORIDE 100 MG: 20 INJECTION, SOLUTION EPIDURAL; INFILTRATION; INTRACAUDAL; PERINEURAL at 07:19

## 2018-02-12 RX ADMIN — Medication 2 G: at 07:40

## 2018-02-12 RX ADMIN — DOCUSATE SODIUM 100 MG: 100 CAPSULE, LIQUID FILLED ORAL at 16:20

## 2018-02-12 RX ADMIN — Medication 2 G: at 23:07

## 2018-02-12 RX ADMIN — ROCURONIUM BROMIDE 50 MG: 10 INJECTION, SOLUTION INTRAVENOUS at 07:19

## 2018-02-12 RX ADMIN — PROPOFOL 180 MG: 10 INJECTION, EMULSION INTRAVENOUS at 07:19

## 2018-02-12 RX ADMIN — HYDROMORPHONE HYDROCHLORIDE 0.5 MG: 2 INJECTION, SOLUTION INTRAMUSCULAR; INTRAVENOUS; SUBCUTANEOUS at 09:23

## 2018-02-12 RX ADMIN — HYDROMORPHONE HYDROCHLORIDE 0.5 MG: 2 INJECTION, SOLUTION INTRAMUSCULAR; INTRAVENOUS; SUBCUTANEOUS at 09:13

## 2018-02-12 RX ADMIN — SODIUM CHLORIDE, SODIUM LACTATE, POTASSIUM CHLORIDE, AND CALCIUM CHLORIDE 75 ML/HR: 600; 310; 30; 20 INJECTION, SOLUTION INTRAVENOUS at 23:26

## 2018-02-12 RX ADMIN — MORPHINE SULFATE: 5 INJECTION, SOLUTION INTRAVENOUS at 09:39

## 2018-02-12 RX ADMIN — FENTANYL CITRATE 100 MCG: 50 INJECTION, SOLUTION INTRAMUSCULAR; INTRAVENOUS at 07:15

## 2018-02-12 RX ADMIN — FENTANYL CITRATE 25 MCG: 50 INJECTION, SOLUTION INTRAMUSCULAR; INTRAVENOUS at 08:53

## 2018-02-12 RX ADMIN — HYDROMORPHONE HYDROCHLORIDE 0.5 MG: 2 INJECTION, SOLUTION INTRAMUSCULAR; INTRAVENOUS; SUBCUTANEOUS at 09:18

## 2018-02-12 RX ADMIN — DEXAMETHASONE SODIUM PHOSPHATE 10 MG: 4 INJECTION, SOLUTION INTRA-ARTICULAR; INTRALESIONAL; INTRAMUSCULAR; INTRAVENOUS; SOFT TISSUE at 07:50

## 2018-02-12 RX ADMIN — GLYCOPYRROLATE 0.4 MG: 0.2 INJECTION INTRAMUSCULAR; INTRAVENOUS at 08:50

## 2018-02-12 RX ADMIN — ONDANSETRON 4 MG: 2 INJECTION INTRAMUSCULAR; INTRAVENOUS at 08:35

## 2018-02-12 RX ADMIN — FENTANYL CITRATE 50 MCG: 50 INJECTION, SOLUTION INTRAMUSCULAR; INTRAVENOUS at 07:51

## 2018-02-12 RX ADMIN — SODIUM CHLORIDE, SODIUM LACTATE, POTASSIUM CHLORIDE, AND CALCIUM CHLORIDE: 600; 310; 30; 20 INJECTION, SOLUTION INTRAVENOUS at 08:22

## 2018-02-12 RX ADMIN — SODIUM CHLORIDE, SODIUM LACTATE, POTASSIUM CHLORIDE, AND CALCIUM CHLORIDE 75 ML/HR: 600; 310; 30; 20 INJECTION, SOLUTION INTRAVENOUS at 06:15

## 2018-02-12 RX ADMIN — FAMOTIDINE 20 MG: 20 TABLET, FILM COATED ORAL at 06:15

## 2018-02-12 RX ADMIN — NEOSTIGMINE METHYLSULFATE 3 MG: 1 INJECTION INTRAVENOUS at 08:50

## 2018-02-12 RX ADMIN — Medication 2 G: at 16:20

## 2018-02-12 NOTE — BRIEF OP NOTE
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]  Postoperative Diagnosis: retroperitoneal cyst [K66.8]    Procedure(s):  LAPAROTOMY EXPLORATORY WITH RETROPERITONEAL CYST RESECTION 18X16 CM, \  Fallopian tube mass biopsy  Surgeon(s) and Role:     * Elias Cyr MD - Primary         Assistant Staff: Nurse Practitioner: Sabas Jacob NP      Surgical Staff:  Circ-1: Ibeth Clay RN  Scrub Tech-1: Constanza Mcleod  Nurse Practitioner: Sabas Jacob NP  Event Time In   Incision Start  7:51 AM   Incision Close  8:52 AM     Anesthesia: General   Estimated Blood Loss: MINIMAL  Specimens:   ID Type Source Tests Collected by Time Destination   1 : retroperitoneal mass Fresh Velma Bhagat MD 2/12/2018  8:29 AM Pathology   2 : nodule left fallopian tube Preservative Abdomen  Elias Cyr MD 2/12/2018  8:31 AM Pathology      Findings: large simple cystic mass arising from left retroperitoneum with blood supply from left gonadal vessel  Complications: none  Implants: * No implants in log *

## 2018-02-12 NOTE — PERIOP NOTES
TRANSFER - OUT REPORT:    Verbal report given to Jim Stafford RN(name) on Claudia Rice  being transferred to [facility](unit) for routine post - op       Report consisted of patients Situation, Background, Assessment and   Recommendations(SBAR). Information from the following report(s) SBAR, Kardex, OR Summary, Procedure Summary, Intake/Output and MAR was reviewed with the receiving nurse. Lines:   Peripheral IV 02/12/18 Right Hand (Active)   Site Assessment Clean, dry, & intact 2/12/2018 10:01 AM   Phlebitis Assessment 0 2/12/2018 10:01 AM   Infiltration Assessment 0 2/12/2018 10:01 AM   Dressing Status Clean, dry, & intact 2/12/2018 10:01 AM   Dressing Type Tape;Transparent 2/12/2018  9:09 AM   Hub Color/Line Status Green; Infusing 2/12/2018  9:09 AM       Peripheral IV 02/12/18 Left Forearm (Active)   Site Assessment Clean, dry, & intact 2/12/2018 10:01 AM   Phlebitis Assessment 0 2/12/2018 10:01 AM   Infiltration Assessment 0 2/12/2018 10:01 AM   Dressing Status Clean, dry, & intact 2/12/2018 10:01 AM   Dressing Type Tape;Transparent 2/12/2018  9:09 AM   Hub Color/Line Status Green;Capped 2/12/2018  9:09 AM        Opportunity for questions and clarification was provided. Patient transported with:   O2 @ 2 liters    VTE prophylaxis orders have been written for Claudia Rice. Patient and family given floor number and nurses name. Family updated re: pt status after security code verified.

## 2018-02-12 NOTE — IP AVS SNAPSHOT
[address] 
[phone] Patient: Munir Wu MRN: [id number] FAP:[id number] About your hospitalization You were admitted on:  February 12, 2018 You last received care in the:  [facility] 2 SURGICAL You were discharged on:  February 14, 2018 Why you were hospitalized Your primary diagnosis was:  Not on File Your diagnoses also included:  Retroperitoneal Mass Follow-up Information Follow up With Details Comments Contact Info Burt Qureshi MD   [facility] [address] [facility] 
[phone] Luda Grimes MD On 2/27/2018 1:45 3 [address] [id number] [phone] Your Scheduled Appointments Tuesday February 27, 2018  1:45 PM EST Global Post Op with Luda Grimes MD  
[facility] ([facility]) [address] [place] [address]  
[phone] Thursday April 05, 2018  8:30 AM EDT RECHECK with Von Stoddard MD  
[facility] ([address] [address] [phone] Discharge Orders None A check malgorzata indicates which time of day the medication should be taken. My Medications START taking these medications Instructions Each Dose to Equal  
 Morning Noon Evening Bedtime  
 docusate sodium 100 mg capsule Commonly known as:  Ramez Dusty Your next dose is: This evening Take 1 Cap by mouth two (2) times a day for 90 days. 100 mg  
    
  
   
   
  
   
  
 oxyCODONE-acetaminophen 7.5-325 mg per tablet Commonly known as:  PERCOCET 7.5 Your next dose is: Take on as needed schedule Take 1-2 Tabs by mouth every four (4) hours as needed. Max Daily Amount: 12 Tabs. 1-2 Tab CONTINUE taking these medications Instructions Each Dose to Equal  
 Morning Noon Evening Bedtime esomeprazole 40 mg capsule Commonly known as:  Nadine Early Your next dose is:  Tomorrow Morning Take 40 mg by mouth daily. 40 mg  
    
  
   
   
   
  
 levothyroxine 75 mcg tablet Commonly known as:  SYNTHROID Your next dose is:  Tomorrow Morning Take 1 Tab by mouth Daily (before breakfast). 75 mcg  
    
  
   
   
   
  
 norgestimate-ethinyl estradiol 0.25-35 mg-mcg Tab Commonly known as:  Samantha Paling Your next dose is:  Tomorrow Morning Take 1 Tab by mouth daily. 1 Tab  
    
  
   
   
   
  
 ondansetron hcl 8 mg tablet Commonly known as:  Yanira Jimenez Your next dose is: Take on as needed schedule Take 1 Tab by mouth every eight (8) hours as needed for Nausea. 8 mg Where to Get Your Medications These medications were sent to [facility]/pharmacy #[id number], [place] [address] [facility] [address] Phone:  [phone]  
  docusate sodium 100 mg capsule Information on where to get these meds will be given to you by the nurse or doctor. ! Ask your nurse or doctor about these medications  
  oxyCODONE-acetaminophen 7.5-325 mg per tablet Discharge Instructions Discharge Instructions/Follow-up Plans: MD Instructions: 
  
Follow-up with Dr. Eric Antoine next Friday, call for appt Keep incisions clean and dry, may remain uncovered, ok to keep dry dressing in belly button so it doesn't stay moist. 
Do not apply lotions, creams or ointments to incisions. 
  
Diet - as tolerated Activity - ambulate - as tolerated - no heavy lifting >10lb. May shower - no tub baths or soaking/submerging. 
  
No driving while taking narcotics. Do not drink alcohol while taking narcotics. Resume other home medications. Continue stool softeners until good bowel function May take Milk of Mag is no BM by tomorrow 
  
 If problems or questions arise, please call our office at [phone]. 
  
Greater than 30 minutes were spent discharging the patient DISCHARGE SUMMARY from Nurse PATIENT INSTRUCTIONS: 
 
 
F-face looks uneven A-arms unable to move or move unevenly S-speech slurred or non-existent T-time-call 911 as soon as signs and symptoms begin-DO NOT go Back to bed or wait to see if you get better-TIME IS BRAIN. Warning Signs of HEART ATTACK Call 911 if you have these symptoms: 
? Chest discomfort. Most heart attacks involve discomfort in the center of the chest that lasts more than a few minutes, or that goes away and comes back. It can feel like uncomfortable pressure, squeezing, fullness, or pain. ? Discomfort in other areas of the upper body. Symptoms can include pain or discomfort in one or both arms, the back, neck, jaw, or stomach. ? Shortness of breath with or without chest discomfort. ? Other signs may include breaking out in a cold sweat, nausea, or lightheadedness. Don't wait more than five minutes to call [address]! Fast action can save your life. Calling 911 is almost always the fastest way to get lifesaving treatment. Emergency Medical Services staff can begin treatment when they arrive  up to an hour sooner than if someone gets to the hospital by car. The discharge information has been reviewed with the patient. The patient verbalized understanding. Discharge medications reviewed with the patient and appropriate educational materials and side effects teaching were provided.  
___________________________________________________________________________ ________________________________________________________ [employer] Announcement We are excited to announce that we are making your provider's discharge notes available to you in [employer]. You will see these notes when they are completed and signed by the physician that discharged you from your recent hospital stay. If you have any questions or concerns about any information you see in [employer], please call the Health Information Department where you were seen or reach out to your Primary Care Provider for more information about your plan of care. Introducing [facility]! Dear Deo Malagon: Thank you for requesting a [employer] account. Our records indicate that you already have an active [employer] account. You can access your account anytime at [email or web address] Did you know that you can access your hospital and ER discharge instructions at any time in [employer]? You can also review all of your test results from your hospital stay or ER visit. Additional Information If you have questions, please visit the Frequently Asked Questions section of the [employer] website at [email or web address] Remember, [employer] is NOT to be used for urgent needs. For medical emergencies, dial 911. Now available from your iPhone and Android! Providers Seen During Your Hospitalization Provider Specialty Primary office phone Tanvir Awan MD General Surgery [phone] Your Primary Care Physician (PCP) Primary Care Physician Office Phone Office Fax Elvis Stinsonty [phone] You are allergic to the following No active allergies Recent Documentation Height Weight BMI OB Status Smoking Status 1.6 m 110.3 kg 43.08 kg/m2 Having regular periods Never Smoker Emergency Contacts Name Discharge Info Relation Home Work Mobile [address] CAREGIVER [3] Spouse [3] [phone] Patient Belongings The following personal items are in your possession at time of discharge: 
  Dental Appliances: None  Visual Aid: Glasses      Home Medications: None   Jewelry: None  Clothing: Shirt, Pants, Footwear, Undergarments    Other Valuables: Eyeglasses Please provide this summary of care documentation to your next provider. Signatures-by signing, you are acknowledging that this After Visit Summary has been reviewed with you and you have received a copy. Patient Signature:  ____________________________________________________________ Date:  ____________________________________________________________  
  
[place] Favorite Provider Signature:  ____________________________________________________________ Date:  ____________________________________________________________

## 2018-02-12 NOTE — OP NOTES
[address] REPORT    Estella Claude  MR#: [id number]  : 1985  ACCOUNT #: [de-identified]   DATE OF SERVICE: 2018    SURGEON:  Chelsie Madrid MD    ASSISTANT:  Humaira Daly NP    PREOPERATIVE DIAGNOSIS:  Large mesenteric cyst.    POSTOPERATIVE DIAGNOSIS:  Large retroperitoneal cyst.    PROCEDURE:    1.  Retroperitoneal cyst resection 18 cm x 16 cm.  2.  Fallopian tube biopsy on the left side. ANESTHESIA:  general    ESTIMATED BLOOD LOSS:  minimal    SPECIMENS REMOVED:  As above    COMPLICATIONS:  none    IMPLANTS:  none    INDICATION:  This is a 25-year-old female who presented with a large mesenteric cyst that has been increasing over the last few years and causing her some symptoms and then surgical resection was recommended to her. She understood the risks and benefits and agreed to proceed. FINDINGS:  This was a large simple cystic mass arising from the left retroperitoneum. The blood supply appeared to be from gonadal vessels. There were also some nodular changes in the left fallopian tube. This was removed. PROCEDURE:  Informed consent was obtained. The patient was brought into the operating room and placed in a supine position. General anesthesia was administered. The patient was then prepped and draped in a routine fashion. We took the lower midline incisions and dissection covers through the dermal layers of the fascia. We incised, the peritoneal cavity was entered, and then a large size was immediately encountered in the left retroperitoneum which was pushing the sigmoid and the rectum and then we started dissection right outside the cyst wall. I believe this is a benign cyst and we used great caution to avoid rupture of the cyst and the cyst appeared to be arising from the left retroperitoneum with blood supply from the gonadal vessels. Several blood vessels were tied and then the cyst was carefully peeled off the retroperitoneum.   DANIA Bowser care was used to avoid bowel injury and the cyst was able to be removed intact and then the surgical field was inspected and there were some nodular changes on the left fallopian tube. This was removed and then hemostasis obtained. No evidence of bleeding or bowel injury at the end of the case. The midline fascia was closed with #1 looped PDS in a running fashion. Skin was closed with staples. Patient tolerated procedure well and was transferred to recovery room in stable condition. All instrument and lap counts were correct. Estimated blood loss was minimal.      Frank Barnard was present and assisted during the entire case.       Gabino Trejo MD       BY / LN  D: 02/12/2018 09:18     T: 02/12/2018 10:08  JOB #: [id number]

## 2018-02-12 NOTE — ANESTHESIA POSTPROCEDURE EVALUATION
Post-Anesthesia Evaluation and Assessment    Patient: Sherry Gale MRN: [id number]  SSN: [id number]    YOB: 1985  Age: 33 y.o. Sex: female       Cardiovascular Function/Vital Signs  Visit Vitals    /66    Pulse 74    Temp 36.9 °C (98.4 °F)    Resp 18    Ht 5' 3\" (1.6 m)    Wt 110.3 kg (243 lb 3.2 oz)    SpO2 98%    BMI 43.08 kg/m2       Patient is status post general anesthesia for Procedure(s):  LAPAROTOMY EXPLORATORY WITH MESENTERIC MASS RESECTION. Nausea/Vomiting: None    Postoperative hydration reviewed and adequate. Pain:  Pain Scale 1: Visual (02/12/18 1001)  Pain Intensity 1: 0 (02/12/18 1001)   Managed    Neurological Status:   Neuro (WDL): Exceptions to WDL (02/12/18 1001)  Neuro  Neurologic State: Sleeping (02/12/18 1001)  LUE Motor Response: Purposeful;Spontaneous  (02/12/18 0909)  LLE Motor Response: Purposeful;Spontaneous  (02/12/18 0909)  RUE Motor Response: Purposeful;Spontaneous  (02/12/18 0909)  RLE Motor Response: Purposeful;Spontaneous  (02/12/18 0909)   At baseline    Mental Status and Level of Consciousness: Arousable    Pulmonary Status:   O2 Device: Nasal cannula (02/12/18 1001)   Adequate oxygenation and airway patent    Complications related to anesthesia: None    Post-anesthesia assessment completed.  No concerns    Signed By: Kd Peterson MD     February 12, 2018

## 2018-02-12 NOTE — PROGRESS NOTES
Dual Skin Assessment    Skin assessment completed with Samuel Smiley  . See below.     Wound Abdomen Mid (Active)   DRESSING STATUS Clean, dry, and intact 2/12/2018 12:15 PM   DRESSING TYPE 4 x 4;Special tape (comment) 2/12/2018 12:15 PM   Drainage Amount  None 2/12/2018 12:15 PM   Wound Odor None 2/12/2018 12:15 PM        Abigail Swain, DOROTHY    2/12/2018 12:42 PM

## 2018-02-13 PROCEDURE — 74011250636 HC RX REV CODE- 250/636: Performed by: SURGERY

## 2018-02-13 PROCEDURE — 74011250637 HC RX REV CODE- 250/637: Performed by: SURGERY

## 2018-02-13 PROCEDURE — 65270000029 HC RM PRIVATE

## 2018-02-13 RX ORDER — OXYCODONE AND ACETAMINOPHEN 7.5; 325 MG/1; MG/1
1-2 TABLET ORAL
Status: DISCONTINUED | OUTPATIENT
Start: 2018-02-13 | End: 2018-02-14 | Stop reason: HOSPADM

## 2018-02-13 RX ADMIN — DOCUSATE SODIUM 100 MG: 100 CAPSULE, LIQUID FILLED ORAL at 08:08

## 2018-02-13 RX ADMIN — LEVOTHYROXINE SODIUM 75 MCG: 75 TABLET ORAL at 06:19

## 2018-02-13 RX ADMIN — Medication 2 G: at 06:19

## 2018-02-13 RX ADMIN — Medication 10 ML: at 06:21

## 2018-02-13 RX ADMIN — OXYCODONE HYDROCHLORIDE AND ACETAMINOPHEN 1 TABLET: 7.5; 325 TABLET ORAL at 22:29

## 2018-02-13 RX ADMIN — PANTOPRAZOLE SODIUM 40 MG: 40 TABLET, DELAYED RELEASE ORAL at 06:18

## 2018-02-13 RX ADMIN — Medication 10 ML: at 22:30

## 2018-02-13 RX ADMIN — ENOXAPARIN SODIUM 40 MG: 40 INJECTION SUBCUTANEOUS at 17:13

## 2018-02-13 RX ADMIN — Medication 10 ML: at 13:35

## 2018-02-13 RX ADMIN — DOCUSATE SODIUM 100 MG: 100 CAPSULE, LIQUID FILLED ORAL at 17:13

## 2018-02-13 RX ADMIN — SODIUM CHLORIDE, SODIUM LACTATE, POTASSIUM CHLORIDE, AND CALCIUM CHLORIDE 75 ML/HR: 600; 310; 30; 20 INJECTION, SOLUTION INTRAVENOUS at 13:35

## 2018-02-13 NOTE — PROGRESS NOTES
Problem: Falls - Risk of  Goal: *Absence of Falls  Document Saul Fall Risk and appropriate interventions in the flowsheet.    Outcome: Progressing Towards Goal  Fall Risk Interventions:  Mobility Interventions: Patient to call before getting OOB         Medication Interventions: Evaluate medications/consider consulting pharmacy, Patient to call before getting OOB, Teach patient to arise slowly    Elimination Interventions: Call light in reach, Patient to call for help with toileting needs, Toilet paper/wipes in reach

## 2018-02-14 VITALS
WEIGHT: 243.2 LBS | HEIGHT: 63 IN | HEART RATE: 77 BPM | OXYGEN SATURATION: 100 % | DIASTOLIC BLOOD PRESSURE: 65 MMHG | BODY MASS INDEX: 43.09 KG/M2 | RESPIRATION RATE: 19 BRPM | SYSTOLIC BLOOD PRESSURE: 113 MMHG | TEMPERATURE: 98.3 F

## 2018-02-14 PROBLEM — R19.00 RETROPERITONEAL MASS: Chronic | Status: ACTIVE | Noted: 2018-02-12

## 2018-02-14 PROCEDURE — 74011250637 HC RX REV CODE- 250/637: Performed by: SURGERY

## 2018-02-14 RX ORDER — DOCUSATE SODIUM 100 MG/1
100 CAPSULE, LIQUID FILLED ORAL 2 TIMES DAILY
Qty: 60 CAP | Refills: 2 | Status: SHIPPED | OUTPATIENT
Start: 2018-02-14 | End: 2018-05-15

## 2018-02-14 RX ORDER — OXYCODONE AND ACETAMINOPHEN 7.5; 325 MG/1; MG/1
1-2 TABLET ORAL
Qty: 40 TAB | Refills: 0 | Status: SHIPPED
Start: 2018-02-14 | End: 2018-06-12 | Stop reason: ALTCHOICE

## 2018-02-14 RX ADMIN — Medication 10 ML: at 06:39

## 2018-02-14 RX ADMIN — OXYCODONE HYDROCHLORIDE AND ACETAMINOPHEN 1 TABLET: 7.5; 325 TABLET ORAL at 10:41

## 2018-02-14 RX ADMIN — LEVOTHYROXINE SODIUM 75 MCG: 75 TABLET ORAL at 06:39

## 2018-02-14 RX ADMIN — PANTOPRAZOLE SODIUM 40 MG: 40 TABLET, DELAYED RELEASE ORAL at 06:39

## 2018-02-14 RX ADMIN — DOCUSATE SODIUM 100 MG: 100 CAPSULE, LIQUID FILLED ORAL at 09:43

## 2018-02-14 NOTE — PROGRESS NOTES
Discharge instructions and prescriptions provided and explained to patient, patient voiced understanding. Medication side effect sheet reviewed with pt. No home meds or valuables to return. Opportunity for questions provided. primary RN to disconnect PCA. Instructed to call once ready to leave the floor.

## 2018-02-14 NOTE — DISCHARGE INSTRUCTIONS
Discharge Instructions/Follow-up Plans:   MD Instructions:     Follow-up with Dr. Elisa Colbert next Friday, call for appt  Keep incisions clean and dry, may remain uncovered, ok to keep dry dressing in belly button so it doesn't stay moist.  Do not apply lotions, creams or ointments to incisions.     Diet - as tolerated   Activity - ambulate - as tolerated - no heavy lifting >10lb. May shower - no tub baths or soaking/submerging.     No driving while taking narcotics. Do not drink alcohol while taking narcotics. Resume other home medications. Continue stool softeners until good bowel function   May take [facility] is no BM by tomorrow     If problems or questions arise, please call our office at [phone].     Greater than 30 minutes were spent discharging the patient      DISCHARGE SUMMARY from Nurse    PATIENT INSTRUCTIONS:    After general anesthesia or intravenous sedation, for 24 hours or while taking prescription Narcotics:  · Limit your activities  · Do not drive and operate hazardous machinery  · Do not make important personal or business decisions  · Do  not drink alcoholic beverages  · If you have not urinated within 8 hours after discharge, please contact your surgeon on call. Report the following to your surgeon:  · Excessive pain, swelling, redness or odor of or around the surgical area  · Temperature over 100.5  · Nausea and vomiting lasting longer than 4 hours or if unable to take medications  · Any signs of decreased circulation or nerve impairment to extremity: change in color, persistent  numbness, tingling, coldness or increase pain  · Any questions    What to do at Home:  Recommended activity: Activity as tolerated, SEE MD INSTRUCTIONS    If you experience any of the following symptoms fever, chills, new or unrelieved pain, persistent nausea or vomiting, complications at surgical site, please follow up with PCP.     *  Please give a list of your current medications to your Primary Care Provider. *  Please update this list whenever your medications are discontinued, doses are      changed, or new medications (including over-the-counter products) are added. *  Please carry medication information at all times in case of emergency situations. These are general instructions for a healthy lifestyle:    No smoking/ No tobacco products/ Avoid exposure to second hand smoke  Surgeon General's Warning:  Quitting smoking now greatly reduces serious risk to your health. Obesity, smoking, and sedentary lifestyle greatly increases your risk for illness    A healthy diet, regular physical exercise & weight monitoring are important for maintaining a healthy lifestyle    You may be retaining fluid if you have a history of heart failure or if you experience any of the following symptoms:  Weight gain of 3 pounds or more overnight or 5 pounds in a week, increased swelling in our hands or feet or shortness of breath while lying flat in bed. Please call your doctor as soon as you notice any of these symptoms; do not wait until your next office visit. Recognize signs and symptoms of STROKE:    F-face looks uneven    A-arms unable to move or move unevenly    S-speech slurred or non-existent    T-time-call 911 as soon as signs and symptoms begin-DO NOT go       Back to bed or wait to see if you get better-TIME IS BRAIN. Warning Signs of HEART ATTACK     Call 911 if you have these symptoms:   Chest discomfort. Most heart attacks involve discomfort in the center of the chest that lasts more than a few minutes, or that goes away and comes back. It can feel like uncomfortable pressure, squeezing, fullness, or pain.  Discomfort in other areas of the upper body. Symptoms can include pain or discomfort in one or both arms, the back, neck, jaw, or stomach.  Shortness of breath with or without chest discomfort.  Other signs may include breaking out in a cold sweat, nausea, or lightheadedness.   Don't wait more than five minutes to call 911 - MINUTES MATTER! Fast action can save your life. Calling 911 is almost always the fastest way to get lifesaving treatment. Emergency Medical Services staff can begin treatment when they arrive -- up to an hour sooner than if someone gets to the hospital by car. The discharge information has been reviewed with the patient. The patient verbalized understanding. Discharge medications reviewed with the patient and appropriate educational materials and side effects teaching were provided.   ___________________________________________________________________________________________________________________________________

## 2018-02-14 NOTE — DISCHARGE SUMMARY
[address], [address] [facility]  [phone]   Discharge Summary     Sherry Gale  MRN: [id number]     : 1985     Age: 33 y.o. Admit date: 2018     Discharge date: Attending Physician: Norman Crabtree NP  Primary Discharge Diagnosis:   Active Problems:    Retroperitoneal mass (2018)      Primary Operations or Procedures Performed :  Procedure(s):  LAPAROTOMY EXPLORATORY WITH MESENTERIC MASS RESECTION     Brief History and Reason for Admission: Sherry Gale was admitted with the following history of present illness. She presented to the office recently for large pelvis cyst.  Initially, it was thought she had a large left ovarian cyst. She was taken to the operating room per  for hysteroscopy, D&C, pelvic washings, peritoneal Bx and fulguration of endometriosis and removal of ovarian cyst.  Intraoperatively, it was found that the cyst did not involve the ovary and general surgery was consulted.  The patient was first aware of the cyst in  when scan showed it to be 11 x 5.4. The most recent was transvaginal US 10-26-17 and it shows the cyst to be 15 x 13 x 9.  Patient states that she has pain in her lower abdomen about once a week for just a couple of seconds, but has had that for quite a few years. Fernanda Tristan denies tenderness on examination of the abdomen, -nausea, -vomiting.      History of PCOS, has had lap lonnie, appendectomy and recently laparoscopy with Incisions C/D/I healing with dermabond noted.  She is morbidly obese. Patient denies smoking or drinking and denies any family history of cancer. Hospital Course:  Uneventful hospital course      Condition at Discharge: good    Discharge Medications:   Current Discharge Medication List      START taking these medications    Details   docusate sodium (COLACE) 100 mg capsule Take 1 Cap by mouth two (2) times a day for 90 days.   Qty: 60 Cap, Refills: 2      oxyCODONE-acetaminophen (PERCOCET 7.5) 7.5-325 mg per tablet Take 1-2 Tabs by mouth every four (4) hours as needed. Max Daily Amount: 12 Tabs. Qty: 40 Tab, Refills: 0    Associated Diagnoses: Retroperitoneal mass         CONTINUE these medications which have NOT CHANGED    Details   esomeprazole (NEXIUM) 40 mg capsule Take 40 mg by mouth daily. norgestimate-ethinyl estradiol (ORTHO-CYCLEN, SPRINTEC) 0.25-35 mg-mcg tab Take 1 Tab by mouth daily. Qty: 1 Package, Refills: 3      levothyroxine (SYNTHROID) 75 mcg tablet Take 1 Tab by mouth Daily (before breakfast). Qty: 30 Tab, Refills: 12      ondansetron hcl (ZOFRAN) 8 mg tablet Take 1 Tab by mouth every eight (8) hours as needed for Nausea. Qty: 30 Tab, Refills: 0               Disposition/Discharge Instructions/Follow-up Care: Follow-up with Dr. Gisell Garcia next Friday, call for appt  Keep incisions clean and dry, may remain uncovered, ok to keep dry dressing in belly button so it doesn't stay moist.  Do not apply lotions, creams or ointments to incisions.     Diet - as tolerated   Activity - ambulate - as tolerated - no heavy lifting >10lb. May shower - no tub baths or soaking/submerging.     No driving while taking narcotics. Do not drink alcohol while taking narcotics. Resume other home medications.   Continue stool softeners until good bowel function   May take [facility] is no BM by tomorrow     If problems or questions arise, please call our office at [phone].     Greater than 30 minutes were spent discharging the patient           Signed:  Patricio Albrecht NP   2/14/2018  9:15 AM

## 2018-02-14 NOTE — PROGRESS NOTES
END OF SHIFT NOTE:    INTAKE/OUTPUT  02/13 0701 - 02/14 0700  In: 1926 [I.V.:1926]  Out: 2750 [Urine:2750]  Voiding: YES  Catheter: NO  Drain:              Flatus: Patient does have flatus present. Stool:  0 occurrences. Characteristics:       Emesis: 0 occurrences. Characteristics:        VITAL SIGNS  Patient Vitals for the past 12 hrs:   Temp Pulse Resp BP SpO2   02/14/18 0400 97.2 °F (36.2 °C) 75 18 126/76 97 %   02/13/18 2350 98.4 °F (36.9 °C) 75 18 118/78 95 %       Pain Assessment  Pain Intensity 1: 2 (02/14/18 0708)  Pain Location 1: Abdomen  Pain Intervention(s) 1: Encouraged PCA  Patient Stated Pain Goal: 3    Ambulating  Yes    Shift report given to oncoming nurse at the bedside.     Dana Melo RN

## 2021-09-14 PROBLEM — N97.9 INFERTILITY, FEMALE: Status: ACTIVE | Noted: 2021-09-14

## 2022-03-18 PROBLEM — N92.1 MENOMETRORRHAGIA: Status: ACTIVE | Noted: 2017-09-19

## 2022-03-18 PROBLEM — N30.10 IC (INTERSTITIAL CYSTITIS): Status: ACTIVE | Noted: 2017-11-09

## 2022-03-18 PROBLEM — D62 ACUTE BLOOD LOSS ANEMIA: Status: ACTIVE | Noted: 2017-09-19

## 2022-03-18 PROBLEM — E66.01 MORBID OBESITY (HCC): Status: ACTIVE | Noted: 2017-09-19

## 2022-03-19 PROBLEM — E03.9 ACQUIRED HYPOTHYROIDISM: Status: ACTIVE | Noted: 2017-09-19

## 2022-03-19 PROBLEM — K66.8 MESENTERIC CYST: Status: ACTIVE | Noted: 2018-01-19

## 2022-03-19 PROBLEM — R19.00 RETROPERITONEAL MASS: Status: ACTIVE | Noted: 2018-02-12

## 2022-03-19 PROBLEM — N97.9 INFERTILITY, FEMALE: Status: ACTIVE | Noted: 2021-09-14

## 2022-03-19 PROBLEM — K86.3 PANCREATIC PSEUDOCYST/CYST: Status: ACTIVE | Noted: 2017-12-18

## 2022-03-19 PROBLEM — N60.19 DIFFUSE CYSTIC MASTOPATHY: Status: ACTIVE | Noted: 2017-11-09

## 2022-03-19 PROBLEM — K86.2 PANCREATIC PSEUDOCYST/CYST: Status: ACTIVE | Noted: 2017-12-18

## 2022-03-19 PROBLEM — K35.32 RUPTURED APPENDICITIS: Status: ACTIVE | Noted: 2017-10-30

## 2022-09-14 ENCOUNTER — TELEPHONE (OUTPATIENT)
Dept: OBGYN CLINIC | Age: 37
End: 2022-09-14

## 2022-09-14 ENCOUNTER — NURSE ONLY (OUTPATIENT)
Dept: OBGYN CLINIC | Age: 37
End: 2022-09-14

## 2022-09-14 DIAGNOSIS — E03.9 ACQUIRED HYPOTHYROIDISM: Primary | ICD-10-CM

## 2022-09-14 NOTE — TELEPHONE ENCOUNTER
Phone call from patient who states she feels like her thyroid levels are out of whack and that her medication may need to be adjusted. Last labs drawn in February. Lab only appt made for today.

## 2022-09-15 LAB
T4 FREE SERPL-MCNC: 0.9 NG/DL (ref 0.78–1.46)
TSH, 3RD GENERATION: 2.22 UIU/ML (ref 0.36–3.74)

## 2022-12-19 RX ORDER — PROGESTERONE 200 MG/1
200 CAPSULE ORAL NIGHTLY
Qty: 20 CAPSULE | Refills: 2 | Status: SHIPPED | OUTPATIENT
Start: 2022-12-19

## 2023-03-15 DIAGNOSIS — E03.9 HYPOTHYROIDISM, UNSPECIFIED: ICD-10-CM

## 2023-03-15 RX ORDER — LEVOTHYROXINE SODIUM 0.05 MG/1
TABLET ORAL
Qty: 90 TABLET | Refills: 3 | OUTPATIENT
Start: 2023-03-15

## 2023-03-28 RX ORDER — LEVOTHYROXINE SODIUM 0.05 MG/1
50 TABLET ORAL
Qty: 30 TABLET | Refills: 2 | Status: SHIPPED | OUTPATIENT
Start: 2023-03-28

## 2023-04-25 RX ORDER — LEVOTHYROXINE SODIUM 0.05 MG/1
TABLET ORAL
Qty: 30 TABLET | Refills: 2 | OUTPATIENT
Start: 2023-04-25

## 2023-05-21 SDOH — ECONOMIC STABILITY: INCOME INSECURITY: HOW HARD IS IT FOR YOU TO PAY FOR THE VERY BASICS LIKE FOOD, HOUSING, MEDICAL CARE, AND HEATING?: NOT VERY HARD

## 2023-05-21 SDOH — ECONOMIC STABILITY: TRANSPORTATION INSECURITY
IN THE PAST 12 MONTHS, HAS LACK OF TRANSPORTATION KEPT YOU FROM MEETINGS, WORK, OR FROM GETTING THINGS NEEDED FOR DAILY LIVING?: NO

## 2023-05-21 SDOH — ECONOMIC STABILITY: FOOD INSECURITY: WITHIN THE PAST 12 MONTHS, THE FOOD YOU BOUGHT JUST DIDN'T LAST AND YOU DIDN'T HAVE MONEY TO GET MORE.: NEVER TRUE

## 2023-05-21 SDOH — ECONOMIC STABILITY: HOUSING INSECURITY
IN THE LAST 12 MONTHS, WAS THERE A TIME WHEN YOU DID NOT HAVE A STEADY PLACE TO SLEEP OR SLEPT IN A SHELTER (INCLUDING NOW)?: NO

## 2023-05-21 SDOH — ECONOMIC STABILITY: FOOD INSECURITY: WITHIN THE PAST 12 MONTHS, YOU WORRIED THAT YOUR FOOD WOULD RUN OUT BEFORE YOU GOT MONEY TO BUY MORE.: NEVER TRUE

## 2023-05-24 ENCOUNTER — OFFICE VISIT (OUTPATIENT)
Dept: OBGYN CLINIC | Age: 38
End: 2023-05-24

## 2023-05-24 VITALS
WEIGHT: 231 LBS | SYSTOLIC BLOOD PRESSURE: 124 MMHG | BODY MASS INDEX: 40.93 KG/M2 | HEIGHT: 63 IN | DIASTOLIC BLOOD PRESSURE: 80 MMHG

## 2023-05-24 DIAGNOSIS — E28.2 PCOS (POLYCYSTIC OVARIAN SYNDROME): ICD-10-CM

## 2023-05-24 DIAGNOSIS — Z01.419 WELL WOMAN EXAM WITH ROUTINE GYNECOLOGICAL EXAM: Primary | ICD-10-CM

## 2023-05-24 DIAGNOSIS — N97.0 ANOVULATORY BLEEDING: ICD-10-CM

## 2023-05-24 DIAGNOSIS — Z12.4 PAP SMEAR FOR CERVICAL CANCER SCREENING: ICD-10-CM

## 2023-05-24 DIAGNOSIS — E03.9 ACQUIRED HYPOTHYROIDISM: ICD-10-CM

## 2023-05-24 DIAGNOSIS — Z11.51 SCREENING FOR HPV (HUMAN PAPILLOMAVIRUS): ICD-10-CM

## 2023-05-24 DIAGNOSIS — N92.6 IRREGULAR MENSES: ICD-10-CM

## 2023-05-24 LAB — PROLACTIN SERPL-MCNC: 13.5 NG/ML

## 2023-05-24 PROCEDURE — 99395 PREV VISIT EST AGE 18-39: CPT | Performed by: OBSTETRICS & GYNECOLOGY

## 2023-05-24 RX ORDER — PROGESTERONE 200 MG/1
200 CAPSULE ORAL NIGHTLY
Qty: 10 CAPSULE | Refills: 12 | Status: SHIPPED | OUTPATIENT
Start: 2023-05-24 | End: 2023-06-03

## 2023-05-24 NOTE — PROGRESS NOTES
Known Problems Sister     Diabetes Father         po meds only    Hypertension Father     No Known Problems Mother     No Known Problems Brother     Cancer Maternal Grandmother         lung cancer    Heart Disease Maternal Grandfather     Cancer Paternal Grandfather         cirrhosis; colon cancer    Cancer Paternal Grandmother         lymphoma         Social History     Socioeconomic History    Marital status:    Tobacco Use    Smoking status: Never    Smokeless tobacco: Never   Vaping Use    Vaping Use: Never used   Substance and Sexual Activity    Alcohol use: No    Drug use: No    Sexual activity: Yes     Partners: Male     Birth control/protection: None           ROS:  Constitutional: Negative for chills, fever and weight loss. HENT: Negative for hearing loss. Eyes: Negative for blurred vision and double vision. Respiratory: Negative for cough, hemoptysis and shortness of breath. Cardiovascular: Negative for chest pain, palpitations and orthopnea. Gastrointestinal: Negative for abdominal pain, blood in stool, constipation, diarrhea, nausea and vomiting. Genitourinary: Negative for dysuria, frequency, hematuria and urgency. Musculoskeletal: Negative for falls, joint pain and myalgias. Skin: Negative for itching and rash. Neurological: Negative for headaches. Endo/Heme/Allergies: Does not bruise/bleed easily. Psychiatric/Behavioral: Negative for depression and suicidal ideas. The patient is not nervous/anxious. All other systems reviewed and are negative. PHYSICAL EXAM:   /80   Ht 5' 3\" (1.6 m)   Wt 231 lb (104.8 kg)   LMP 03/20/2023   BMI 40.92 kg/m²       Constitutional: She appears well-developed and well-nourished. No distress. Morbidly obese   HENT:    Head: Normocephalic and atraumatic. Neck: Normal range of motion. Cardiovascular: Normal rate, regular rhythm and normal heart sounds. Exam reveals no gallop and no friction rub.     No murmur

## 2023-05-25 LAB
EST. AVERAGE GLUCOSE BLD GHB EST-MCNC: 94 MG/DL
HBA1C MFR BLD: 4.9 % (ref 4.8–5.6)
T4 FREE SERPL-MCNC: 1.2 NG/DL (ref 0.78–1.46)
TSH W FREE THYROID IF ABNORMAL: 0.99 UIU/ML (ref 0.36–3.74)

## 2023-05-26 LAB — DHEA-S SERPL-MCNC: 94.6 UG/DL (ref 57.3–279.2)

## 2023-05-28 LAB
TESTOST FREE SERPL-MCNC: 0.7 PG/ML (ref 0–4.2)
TESTOST SERPL-MCNC: 17 NG/DL (ref 8–60)

## 2023-05-30 LAB — MIS SERPL-MCNC: 0.08 NG/ML

## 2023-05-30 RX ORDER — LEVOTHYROXINE SODIUM 0.05 MG/1
50 TABLET ORAL
Qty: 30 TABLET | Refills: 12 | Status: SHIPPED | OUTPATIENT
Start: 2023-05-30

## 2023-05-30 RX ORDER — LEVOTHYROXINE SODIUM 0.05 MG/1
TABLET ORAL
Qty: 30 TABLET | Refills: 2 | Status: SHIPPED | OUTPATIENT
Start: 2023-05-30 | End: 2023-05-30 | Stop reason: SDUPTHER

## 2023-06-02 LAB
CYTOLOGIST CVX/VAG CYTO: NORMAL
CYTOLOGY CVX/VAG DOC THIN PREP: NORMAL
HPV APTIMA: NEGATIVE
HPV GENOTYPE REFLEX: NORMAL
Lab: NORMAL
Lab: NORMAL
PATH REPORT.FINAL DX SPEC: NORMAL
STAT OF ADQ CVX/VAG CYTO-IMP: NORMAL

## 2023-09-27 NOTE — PROGRESS NOTES
Discharge Summary    Date of Admission: 2023  Date of Discharge:  2023      Patient: Celsa King      MR#:[id number]    Primary Surgeon/OB: Purnima Morrissey MD    Discharge Surgeon/OB:Ghanshyam    Presenting Problem/History of Present Illness  Pregnancy with 39 completed weeks gestation [Z3A.39]     Patient Active Problem List   Diagnosis    Prenatal care, antepartum    Elevated glucose tolerance test    Pregnancy with 39 completed weeks gestation    Delivery of pregnancy by  section         Discharge Diagnosis:  section at 39w0d    Procedures:  , Low Transverse     2023    10:38 PM      Feeding method: Breastfeeding Status: Yes    Rh Immune globulin given: not applicable    Rubella vaccine given: not applicable    Discharge Date: 2023; Discharge Time: 09:36 EDT    Early Discharge:  NO    Hospital Course  Patient is a 32 y.o. female  at 39w0d status post  section with uneventful postoperative recovery.  Patient was advanced to regular diet on postoperative day#1.  On discharge, ambulating, tolerating a regular diet without any difficulties and her incision is dry, clean and intact.     Infant:   male  fetus 3595 g (7 lb 14.8 oz)  with Apgar scores of 8 , 9  at five minutes.    Circumcision: yes    Condition on Discharge:  Stable    Vital Signs  Temp:  [97.7 °F (36.5 °C)-98.6 °F (37 °C)] 98.1 °F (36.7 °C)  Heart Rate:  [67-85] 79  Resp:  [16-18] 16  BP: (119-129)/(63-90) 129/90    Lab Results   Component Value Date    WBC 15.68 (H) 2023    HGB 10.2 (L) 2023    HCT 30.4 (L) 2023    MCV 91.8 2023     2023       Discharge Disposition  Home or Self Care    Discharge Medications     Discharge Medications        New Medications        Instructions Start Date   ibuprofen 600 MG tablet  Commonly known as: ADVIL,MOTRIN   600 mg, Oral, Every 6 Hours      oxyCODONE 5 MG immediate release tablet  Commonly known as:  Problem: Falls - Risk of  Goal: *Absence of Falls  Document Saul Fall Risk and appropriate interventions in the flowsheet.    Outcome: Progressing Towards Goal  Fall Risk Interventions:              Medication Interventions: Patient to call before getting OOB, Teach patient to arise slowly ROXICODONE   5 mg, Oral, Every 4 Hours PRN             Continue These Medications        Instructions Start Date   PRENATAL PO   Oral               Discharge Diet:     Activity at Discharge:   Activity Instructions       Driving Restrictions      Type of Restriction: Driving    Driving Restrictions: No Driving (Time Limited)    Length: 2 Weeks    Lifting Restrictions      Type of Restriction: Lifting    Lifting Restrictions: Lifting Restriction (Indicate Limit)    Weight Limit (Pounds): -5    Length of Lifting Restriction: 3-4 weeks    Sexual Activity Restrictions      Type of Restriction: Sex    Explain Sexual Activity Restrictions: Pelvic rest for 6 weeks    Work Restrictions      Type of Restriction: Work    May Return to Work: Other    Return to Work Instructions: May return to work after 6 weeks            Follow-up Appointments  Future Appointments   Date Time Provider Department Center   11/6/2023  8:00 AM Purnima Morrissey MD [facility]     Additional Instructions for the Follow-ups that You Need to Schedule       Call MD With Problems / Concerns   As directed      Instructions: Call for temp >/= 100.4, severe pain, severe bleeding, headache that does not improve with rest, medication, blurred vision, or postpartum depression. Monitor incision for signs of infection including, foul odor, discharge or separation of the wound.  Call for blood clots larger than a golf ball or saturating a pad in less than an hour.    Order Comments: Instructions: Call for temp >/= 100.4, severe pain, severe bleeding, headache that does not improve with rest, medication, blurred vision, or postpartum depression. Monitor incision for signs of infection including, foul odor, discharge or separation of the wound.  Call for blood clots larger than a golf ball or saturating a pad in less than an hour.         Discharge Follow-up with Specified Provider: FU in 2 weeks with an APRN and 6 weeks with Dr. Morrissey; 2 Weeks   As  directed      To: FU in 2 weeks with an APRN and 6 weeks with Dr. Morrissey   Follow Up: 2 Weeks                KHOA Lopez  09/27/23  09:36 EDT

## 2024-05-30 RX ORDER — LEVOTHYROXINE SODIUM 0.05 MG/1
50 TABLET ORAL
Qty: 90 TABLET | Refills: 4 | OUTPATIENT
Start: 2024-05-30

## 2024-06-04 ENCOUNTER — PATIENT MESSAGE (OUTPATIENT)
Dept: OBGYN CLINIC | Age: 39
End: 2024-06-04

## 2024-06-04 RX ORDER — LEVOTHYROXINE SODIUM 0.05 MG/1
50 TABLET ORAL
Qty: 90 TABLET | Refills: 4 | OUTPATIENT
Start: 2024-06-04

## 2024-06-04 RX ORDER — LEVOTHYROXINE SODIUM 0.05 MG/1
50 TABLET ORAL
Qty: 90 TABLET | Refills: 0 | Status: SHIPPED | OUTPATIENT
Start: 2024-06-04

## 2024-06-04 NOTE — TELEPHONE ENCOUNTER
From: Sandra Mills  To: Dr. Suzanna Nuñez  Sent: 6/4/2024 11:04 AM EDT  Subject: Levothyroxine refill    Hi. I wanted to inquire on my last refill of my Levothyroxine. I had one refill left for 3 months that was scheduled for May 29, but [facility] has said it can't be refilled. I wanted to see if that could be approved? I am actually out of state until mid August taking care of a family member. My  is going to ship it to me. If I need to schedule my yearly, I can but it won't be until late August before I could make it in. Thank you for your help.

## 2024-09-03 RX ORDER — LEVOTHYROXINE SODIUM 50 UG/1
50 TABLET ORAL
Qty: 90 TABLET | Refills: 0 | OUTPATIENT
Start: 2024-09-03

## 2024-11-04 ENCOUNTER — OFFICE VISIT (OUTPATIENT)
Dept: OBGYN CLINIC | Age: 39
End: 2024-11-04
Payer: COMMERCIAL

## 2024-11-04 VITALS
WEIGHT: 248 LBS | HEIGHT: 63 IN | SYSTOLIC BLOOD PRESSURE: 124 MMHG | DIASTOLIC BLOOD PRESSURE: 70 MMHG | BODY MASS INDEX: 43.94 KG/M2

## 2024-11-04 DIAGNOSIS — N93.9 ABNORMAL UTERINE BLEEDING (AUB): Primary | ICD-10-CM

## 2024-11-04 PROCEDURE — 99213 OFFICE O/P EST LOW 20 MIN: CPT | Performed by: OBSTETRICS & GYNECOLOGY

## 2024-11-04 RX ORDER — MEDROXYPROGESTERONE ACETATE 10 MG
10 TABLET ORAL EVERY 6 HOURS
Qty: 30 TABLET | Refills: 3 | Status: SHIPPED | OUTPATIENT
Start: 2024-11-04

## 2024-11-04 SDOH — ECONOMIC STABILITY: FOOD INSECURITY: WITHIN THE PAST 12 MONTHS, YOU WORRIED THAT YOUR FOOD WOULD RUN OUT BEFORE YOU GOT MONEY TO BUY MORE.: NEVER TRUE

## 2024-11-04 SDOH — ECONOMIC STABILITY: FOOD INSECURITY: WITHIN THE PAST 12 MONTHS, THE FOOD YOU BOUGHT JUST DIDN'T LAST AND YOU DIDN'T HAVE MONEY TO GET MORE.: NEVER TRUE

## 2024-11-04 SDOH — ECONOMIC STABILITY: INCOME INSECURITY: HOW HARD IS IT FOR YOU TO PAY FOR THE VERY BASICS LIKE FOOD, HOUSING, MEDICAL CARE, AND HEATING?: NOT HARD AT ALL

## 2024-11-04 NOTE — PROGRESS NOTES
The patient is here for  problems including aub     HISTORY:      No LMP recorded. (Menstrual status: Irregular periods).SEE BELOW      Current Outpatient Medications on File Prior to Visit   Medication Sig Dispense Refill    levothyroxine (SYNTHROID) 50 MCG tablet Take 1 tablet by mouth every morning (before breakfast) (Patient not taking: Reported on 11/4/2024) 90 tablet 0    progesterone (PROMETRIUM) 200 MG CAPS capsule Take 1 capsule by mouth nightly for 10 days 10 capsule 12    progesterone (PROMETRIUM) 200 MG CAPS capsule Take 1 capsule by mouth nightly For 10 days if period does not begin on its own. (Patient not taking: Reported on 5/24/2023) 20 capsule 2    fexofenadine (ALLEGRA) 180 MG tablet Take 1 tablet by mouth daily (Patient not taking: Reported on 11/4/2024)      tiotropium (SPIRIVA RESPIMAT) 1.25 MCG/ACT AERS inhaler Inhale 2 puffs into the lungs daily (Patient not taking: Reported on 11/4/2024)       No current facility-administered medications on file prior to visit.   SEE LIST    ROS:      PHYSICAL EXAM:  Blood pressure 124/70, height 1.6 m (5' 3\"), weight 112.5 kg (248 lb).    The patient appears well, alert, oriented x 3, in no distress.  Lungs are clear. Heart RRR, no murmurs. Abdomen soft without tenderness, guarding, mass or organomegaly.  Pelvic: bg     ASSESSMENT:DIAGNOSIS DISCUSSED INCLUDING DIFFERENTIAL  Not preg \" not shocky not lightheaded  pap then sono pending  aub  reg cycle then 3wks of bleeding offered all   PLAN:    No orders of the defined types were placed in this encounter.    Many questions answered history reviewed precharting done required 30 minute of time discussing a vaiety of problems  goals are set  follow up planned

## 2024-11-27 ENCOUNTER — PROCEDURE VISIT (OUTPATIENT)
Dept: OBGYN CLINIC | Age: 39
End: 2024-11-27
Payer: COMMERCIAL

## 2024-11-27 ENCOUNTER — OFFICE VISIT (OUTPATIENT)
Dept: OBGYN CLINIC | Age: 39
End: 2024-11-27
Payer: COMMERCIAL

## 2024-11-27 VITALS
BODY MASS INDEX: 43.58 KG/M2 | HEART RATE: 115 BPM | SYSTOLIC BLOOD PRESSURE: 114 MMHG | DIASTOLIC BLOOD PRESSURE: 80 MMHG | WEIGHT: 246 LBS

## 2024-11-27 DIAGNOSIS — N92.4 EXCESSIVE BLEEDING IN PREMENOPAUSAL PERIOD: Primary | ICD-10-CM

## 2024-11-27 DIAGNOSIS — N84.0 ENDOMETRIAL POLYP: ICD-10-CM

## 2024-11-27 DIAGNOSIS — N93.9 ABNORMAL UTERINE BLEEDING (AUB): Primary | ICD-10-CM

## 2024-11-27 PROCEDURE — 99215 OFFICE O/P EST HI 40 MIN: CPT | Performed by: OBSTETRICS & GYNECOLOGY

## 2024-11-27 PROCEDURE — 76830 TRANSVAGINAL US NON-OB: CPT | Performed by: OBSTETRICS & GYNECOLOGY

## 2024-11-27 RX ORDER — MEDROXYPROGESTERONE ACETATE 10 MG
10 TABLET ORAL EVERY 6 HOURS
Qty: 30 TABLET | Refills: 3 | Status: SHIPPED | OUTPATIENT
Start: 2024-11-27

## 2024-11-27 RX ORDER — TRANEXAMIC ACID 650 MG/1
1300 TABLET ORAL 3 TIMES DAILY
Qty: 30 TABLET | Refills: 3 | Status: SHIPPED | OUTPATIENT
Start: 2024-11-27

## 2024-11-27 NOTE — PROGRESS NOTES
ULTRASOUND    [place] vol 81  Stripe 17mm   polyp seen      The patient is here for  problems including heavy bleeding     HISTORY:  Current Meds reviewed, Surgical and Medical history seen     No LMP recorded. (Menstrual status: Irregular periods).SEE BELOW      Current Outpatient Medications on File Prior to Visit   Medication Sig Dispense Refill    medroxyPROGESTERone (PROVERA) 10 MG tablet Take 1 tablet by mouth every 6 hours When bleeding decreases over 48hours then space the pills every 8h then every 12h then once a day please 30 tablet 3    levothyroxine (SYNTHROID) 50 MCG tablet Take 1 tablet by mouth every morning (before breakfast) (Patient not taking: Reported on 11/4/2024) 90 tablet 0    progesterone (PROMETRIUM) 200 MG CAPS capsule Take 1 capsule by mouth nightly for 10 days 10 capsule 12    progesterone (PROMETRIUM) 200 MG CAPS capsule Take 1 capsule by mouth nightly For 10 days if period does not begin on its own. (Patient not taking: Reported on 5/24/2023) 20 capsule 2    fexofenadine (ALLEGRA) 180 MG tablet Take 1 tablet by mouth daily (Patient not taking: Reported on 11/4/2024)      tiotropium (SPIRIVA RESPIMAT) 1.25 MCG/ACT AERS inhaler Inhale 2 puffs into the lungs daily (Patient not taking: Reported on 11/4/2024)       No current facility-administered medications on file prior to visit.   SEE LIST    ROS:      PHYSICAL EXAM:  There were no vitals taken for this visit.    The patient appears well, alert, oriented x 3, in no distress.  Lungs are clear. Heart RRR, no murmurs. Abdomen soft without tenderness, guarding, mass or organomegaly.  Pelvic: bg     Orders placed:    Prescriptions written :    ASSESSMENT/PLAn :DIAGNOSIS DISCUSSED INCLUDING DIFFERENTIAL  needs hyst dc soon for thickened stripe /polyp  not orthostatic today prov refill given and lysteda prn     Heavy bleeding     No orders of the defined types were placed in this encounter.    Many

## 2024-12-02 ENCOUNTER — OFFICE VISIT (OUTPATIENT)
Dept: OBGYN CLINIC | Age: 39
End: 2024-12-02
Payer: COMMERCIAL

## 2024-12-02 VITALS
WEIGHT: 246 LBS | SYSTOLIC BLOOD PRESSURE: 114 MMHG | HEIGHT: 63 IN | DIASTOLIC BLOOD PRESSURE: 80 MMHG | BODY MASS INDEX: 43.59 KG/M2

## 2024-12-02 DIAGNOSIS — N92.1 MENORRHAGIA WITH IRREGULAR CYCLE: Primary | ICD-10-CM

## 2024-12-02 PROCEDURE — 99214 OFFICE O/P EST MOD 30 MIN: CPT | Performed by: OBSTETRICS & GYNECOLOGY

## 2024-12-02 RX ORDER — MEDROXYPROGESTERONE ACETATE 10 MG
10 TABLET ORAL 2 TIMES DAILY
Qty: 30 TABLET | Refills: 3 | Status: SHIPPED | OUTPATIENT
Start: 2024-12-02

## 2024-12-02 NOTE — PROGRESS NOTES
CC:   Chief Complaint   Patient presents with    Consultation     Surgery consult - [place] pt          HPI:    Sandra  is a 39 y.o. , , No LMP recorded. (Menstrual status: Irregular periods).,  who is seen for surgery consult for HMB, suspected endometrial polyp.     Prior workup:   US: 81 cc uterus with suspected endometrial polyp  Pap negative 2024 Hg 12.4    Hx of endo ([facility]), retroperitoneal cyst resection, dasha  HX of PCOS  D&C in 2017 for similar issues that was benign. Has been taking cyclic Prometrium since for cycle regulation  Class 3 obesity (BMI 43.5)      GYN HISTORY:  As per HPI      Current Outpatient Medications on File Prior to Visit   Medication Sig Dispense Refill    levothyroxine (SYNTHROID) 50 MCG tablet Take 1 tablet by mouth every morning (before breakfast) 90 tablet 0    fexofenadine (ALLEGRA) 180 MG tablet Take 1 tablet by mouth daily       No current facility-administered medications on file prior to visit.         Past Medical History:   Diagnosis Date    Anovulatory bleeding     Endometriosis determined by laparoscopy     see op note 17     GERD (gastroesophageal reflux disease)     controlled with med    Hypothyroid     on meds    Infertility, female     Knee pain 12/10/2012    Morbid obesity     BMI- 42.2  on 18    PCOS (polycystic ovarian syndrome) 12/10/2012    Ruptured appendix     s/p open appy    Severe anemia     hgb emily 5.8  (see H&P 17)         Past Surgical History:   Procedure Laterality Date    APPENDECTOMY      ruptured, performed open    CHOLECYSTECTOMY  2014    laparoscopic, [facility]     GYN      hysterostostomy; exp lap for tumor/ D&C         Outpatient Encounter Medications as of 2024   Medication Sig Dispense Refill    medroxyPROGESTERone (PROVERA) 10 MG tablet Take 1 tablet by mouth in the morning and at bedtime When bleeding decreases over 48hours then space the pills every 8h then every 12h then once a day

## 2024-12-03 ENCOUNTER — PREP FOR PROCEDURE (OUTPATIENT)
Dept: OBGYN CLINIC | Age: 39
End: 2024-12-03

## 2024-12-03 DIAGNOSIS — N92.0 EXCESSIVE OR FREQUENT MENSTRUATION: ICD-10-CM

## 2024-12-03 DIAGNOSIS — N84.0 ENDOMETRIAL POLYP: ICD-10-CM

## 2024-12-11 NOTE — PERIOP NOTE
Patient verified name and .  Order for consent  was not found in EHR; patient verifies procedure.   Type 1B surgery, phone assessment complete.  Orders NOT received.  Labs per surgeon: No orders received.   Labs per anesthesia protocol: Hgb 12.4 on 24; results within anesthesia guidelines.     Patient answered medical/surgical history questions at their best of ability. All prior to admission medications documented in EPIC.    Patient instructed to continue taking all prescription medications up to the day of surgery but to take only the following medications the day of surgery according to anesthesia guidelines with a small sip of water: Allegra PRN, Levothyroxine.      Patient informed that all vitamins and supplements should be held 7 days prior to surgery and NSAIDS 5 days prior to surgery.     Patient instructed on the following:    > Arrive at [facility], time of arrival to be called the day before by 1700  > NPO after midnight, unless otherwise indicated, including gum, mints, and etc.   >**Please drink 32 ounces of non-caffeinated clear liquids, on the day of surgery, 2 hours prior to your arrival time to avoid dehydration.   > Responsible adult must drive patient to the hospital, stay during surgery, and patient will need supervision 24 hours after anesthesia  > Use non moisturizing soap in shower the night before surgery and on the morning of surgery  > All piercings must be removed prior to arrival.    > Leave all valuables (money and jewelry) at home but bring insurance card and ID on DOS.   > You may be required to pay a deductible or co-pay on the day of your procedure. You can pre-pay by calling [phone] if your surgery is at the [facility] or [phone] if your surgery is at the [facility].  > Do not wear make-up, nail polish, lotions, cologne, perfumes, powders, or oil on skin. Artificial nails are not permitted.

## 2024-12-17 ENCOUNTER — ANESTHESIA EVENT (OUTPATIENT)
Dept: SURGERY | Age: 39
End: 2024-12-17
Payer: COMMERCIAL

## 2024-12-18 ENCOUNTER — ANESTHESIA (OUTPATIENT)
Dept: SURGERY | Age: 39
End: 2024-12-18
Payer: COMMERCIAL

## 2024-12-18 ENCOUNTER — HOSPITAL ENCOUNTER (OUTPATIENT)
Age: 39
Setting detail: OUTPATIENT SURGERY
Discharge: HOME | End: 2024-12-18
Attending: OBSTETRICS & GYNECOLOGY | Admitting: OBSTETRICS & GYNECOLOGY
Payer: COMMERCIAL

## 2024-12-18 VITALS
HEART RATE: 80 BPM | TEMPERATURE: 98.1 F | HEIGHT: 63 IN | BODY MASS INDEX: 44.03 KG/M2 | SYSTOLIC BLOOD PRESSURE: 111 MMHG | DIASTOLIC BLOOD PRESSURE: 73 MMHG | OXYGEN SATURATION: 100 % | RESPIRATION RATE: 16 BRPM | WEIGHT: 248.5 LBS

## 2024-12-18 PROBLEM — N93.9 ABNORMAL UTERINE BLEEDING (AUB): Status: ACTIVE | Noted: 2024-12-18

## 2024-12-18 LAB — HCG UR QL: NEGATIVE

## 2024-12-18 PROCEDURE — 3600000013 HC SURGERY LEVEL 3 ADDTL 15MIN: Performed by: OBSTETRICS & GYNECOLOGY

## 2024-12-18 PROCEDURE — 7100000010 HC PHASE II RECOVERY - FIRST 15 MIN: Performed by: OBSTETRICS & GYNECOLOGY

## 2024-12-18 PROCEDURE — 58558 HYSTEROSCOPY BIOPSY: CPT | Performed by: OBSTETRICS & GYNECOLOGY

## 2024-12-18 PROCEDURE — 3600000003 HC SURGERY LEVEL 3 BASE: Performed by: OBSTETRICS & GYNECOLOGY

## 2024-12-18 PROCEDURE — 6360000002 HC RX W HCPCS: Performed by: STUDENT IN AN ORGANIZED HEALTH CARE EDUCATION/TRAINING PROGRAM

## 2024-12-18 PROCEDURE — 7100000000 HC PACU RECOVERY - FIRST 15 MIN: Performed by: OBSTETRICS & GYNECOLOGY

## 2024-12-18 PROCEDURE — 7100000001 HC PACU RECOVERY - ADDTL 15 MIN: Performed by: OBSTETRICS & GYNECOLOGY

## 2024-12-18 PROCEDURE — 3700000001 HC ADD 15 MINUTES (ANESTHESIA): Performed by: OBSTETRICS & GYNECOLOGY

## 2024-12-18 PROCEDURE — 2500000003 HC RX 250 WO HCPCS: Performed by: NURSE ANESTHETIST, CERTIFIED REGISTERED

## 2024-12-18 PROCEDURE — 6360000002 HC RX W HCPCS: Performed by: NURSE ANESTHETIST, CERTIFIED REGISTERED

## 2024-12-18 PROCEDURE — 2709999900 HC NON-CHARGEABLE SUPPLY: Performed by: OBSTETRICS & GYNECOLOGY

## 2024-12-18 PROCEDURE — 2500000003 HC RX 250 WO HCPCS: Performed by: STUDENT IN AN ORGANIZED HEALTH CARE EDUCATION/TRAINING PROGRAM

## 2024-12-18 PROCEDURE — 6370000000 HC RX 637 (ALT 250 FOR IP): Performed by: OBSTETRICS & GYNECOLOGY

## 2024-12-18 PROCEDURE — 7100000011 HC PHASE II RECOVERY - ADDTL 15 MIN: Performed by: OBSTETRICS & GYNECOLOGY

## 2024-12-18 PROCEDURE — 81025 URINE PREGNANCY TEST: CPT

## 2024-12-18 PROCEDURE — 3700000000 HC ANESTHESIA ATTENDED CARE: Performed by: OBSTETRICS & GYNECOLOGY

## 2024-12-18 RX ORDER — SODIUM CHLORIDE 0.9 % (FLUSH) 0.9 %
5-40 SYRINGE (ML) INJECTION EVERY 12 HOURS SCHEDULED
Status: DISCONTINUED | OUTPATIENT
Start: 2024-12-18 | End: 2024-12-18 | Stop reason: HOSPADM

## 2024-12-18 RX ORDER — IBUPROFEN 800 MG/1
800 TABLET, FILM COATED ORAL 2 TIMES DAILY PRN
Qty: 180 TABLET | Refills: 1 | Status: SHIPPED | OUTPATIENT
Start: 2024-12-18

## 2024-12-18 RX ORDER — MIDAZOLAM HYDROCHLORIDE 1 MG/ML
INJECTION, SOLUTION INTRAMUSCULAR; INTRAVENOUS
Status: DISCONTINUED | OUTPATIENT
Start: 2024-12-18 | End: 2024-12-18 | Stop reason: SDUPTHER

## 2024-12-18 RX ORDER — ROCURONIUM BROMIDE 10 MG/ML
INJECTION, SOLUTION INTRAVENOUS
Status: DISCONTINUED | OUTPATIENT
Start: 2024-12-18 | End: 2024-12-18 | Stop reason: SDUPTHER

## 2024-12-18 RX ORDER — SODIUM CHLORIDE, SODIUM LACTATE, POTASSIUM CHLORIDE, CALCIUM CHLORIDE 600; 310; 30; 20 MG/100ML; MG/100ML; MG/100ML; MG/100ML
INJECTION, SOLUTION INTRAVENOUS CONTINUOUS
Status: DISCONTINUED | OUTPATIENT
Start: 2024-12-18 | End: 2024-12-18 | Stop reason: HOSPADM

## 2024-12-18 RX ORDER — LIDOCAINE HYDROCHLORIDE 10 MG/ML
1 INJECTION, SOLUTION INFILTRATION; PERINEURAL
Status: DISCONTINUED | OUTPATIENT
Start: 2024-12-18 | End: 2024-12-18 | Stop reason: HOSPADM

## 2024-12-18 RX ORDER — FENTANYL CITRATE 50 UG/ML
INJECTION, SOLUTION INTRAMUSCULAR; INTRAVENOUS
Status: DISCONTINUED | OUTPATIENT
Start: 2024-12-18 | End: 2024-12-18 | Stop reason: SDUPTHER

## 2024-12-18 RX ORDER — ONDANSETRON 2 MG/ML
4 INJECTION INTRAMUSCULAR; INTRAVENOUS
Status: DISCONTINUED | OUTPATIENT
Start: 2024-12-18 | End: 2024-12-18 | Stop reason: HOSPADM

## 2024-12-18 RX ORDER — LIDOCAINE HYDROCHLORIDE 20 MG/ML
INJECTION, SOLUTION EPIDURAL; INFILTRATION; INTRACAUDAL; PERINEURAL
Status: DISCONTINUED | OUTPATIENT
Start: 2024-12-18 | End: 2024-12-18 | Stop reason: SDUPTHER

## 2024-12-18 RX ORDER — NALOXONE HYDROCHLORIDE 0.4 MG/ML
INJECTION, SOLUTION INTRAMUSCULAR; INTRAVENOUS; SUBCUTANEOUS PRN
Status: DISCONTINUED | OUTPATIENT
Start: 2024-12-18 | End: 2024-12-18 | Stop reason: HOSPADM

## 2024-12-18 RX ORDER — PROCHLORPERAZINE EDISYLATE 5 MG/ML
5 INJECTION INTRAMUSCULAR; INTRAVENOUS
Status: DISCONTINUED | OUTPATIENT
Start: 2024-12-18 | End: 2024-12-18 | Stop reason: HOSPADM

## 2024-12-18 RX ORDER — OXYCODONE HYDROCHLORIDE 5 MG/1
5 TABLET ORAL
Status: DISCONTINUED | OUTPATIENT
Start: 2024-12-18 | End: 2024-12-18 | Stop reason: HOSPADM

## 2024-12-18 RX ORDER — SUCCINYLCHOLINE/SOD CL,ISO/PF 200MG/10ML
SYRINGE (ML) INTRAVENOUS
Status: DISCONTINUED | OUTPATIENT
Start: 2024-12-18 | End: 2024-12-18 | Stop reason: SDUPTHER

## 2024-12-18 RX ORDER — FENTANYL CITRATE 50 UG/ML
100 INJECTION, SOLUTION INTRAMUSCULAR; INTRAVENOUS
Status: DISCONTINUED | OUTPATIENT
Start: 2024-12-18 | End: 2024-12-18 | Stop reason: HOSPADM

## 2024-12-18 RX ORDER — GLYCOPYRROLATE 0.2 MG/ML
INJECTION INTRAMUSCULAR; INTRAVENOUS
Status: DISCONTINUED | OUTPATIENT
Start: 2024-12-18 | End: 2024-12-18 | Stop reason: SDUPTHER

## 2024-12-18 RX ORDER — PROPOFOL 10 MG/ML
INJECTION, EMULSION INTRAVENOUS
Status: DISCONTINUED | OUTPATIENT
Start: 2024-12-18 | End: 2024-12-18 | Stop reason: SDUPTHER

## 2024-12-18 RX ORDER — EPHEDRINE SULFATE 5 MG/ML
INJECTION INTRAVENOUS
Status: DISCONTINUED | OUTPATIENT
Start: 2024-12-18 | End: 2024-12-18 | Stop reason: SDUPTHER

## 2024-12-18 RX ORDER — MIDAZOLAM HYDROCHLORIDE 2 MG/2ML
2 INJECTION, SOLUTION INTRAMUSCULAR; INTRAVENOUS
Status: DISCONTINUED | OUTPATIENT
Start: 2024-12-18 | End: 2024-12-18 | Stop reason: HOSPADM

## 2024-12-18 RX ORDER — SODIUM CHLORIDE 9 MG/ML
INJECTION, SOLUTION INTRAVENOUS PRN
Status: DISCONTINUED | OUTPATIENT
Start: 2024-12-18 | End: 2024-12-18 | Stop reason: HOSPADM

## 2024-12-18 RX ORDER — DEXAMETHASONE SODIUM PHOSPHATE 10 MG/ML
INJECTION INTRAMUSCULAR; INTRAVENOUS
Status: DISCONTINUED | OUTPATIENT
Start: 2024-12-18 | End: 2024-12-18 | Stop reason: SDUPTHER

## 2024-12-18 RX ORDER — SODIUM CHLORIDE 0.9 % (FLUSH) 0.9 %
5-40 SYRINGE (ML) INJECTION PRN
Status: DISCONTINUED | OUTPATIENT
Start: 2024-12-18 | End: 2024-12-18 | Stop reason: HOSPADM

## 2024-12-18 RX ORDER — ONDANSETRON 2 MG/ML
INJECTION INTRAMUSCULAR; INTRAVENOUS
Status: DISCONTINUED | OUTPATIENT
Start: 2024-12-18 | End: 2024-12-18 | Stop reason: SDUPTHER

## 2024-12-18 RX ADMIN — GLYCOPYRROLATE 0.1 MG: 0.2 INJECTION INTRAMUSCULAR; INTRAVENOUS at 14:25

## 2024-12-18 RX ADMIN — ROCURONIUM BROMIDE 5 MG: 50 INJECTION INTRAVENOUS at 14:08

## 2024-12-18 RX ADMIN — DEXAMETHASONE SODIUM PHOSPHATE 10 MG: 10 INJECTION INTRAMUSCULAR; INTRAVENOUS at 14:14

## 2024-12-18 RX ADMIN — ONDANSETRON 4 MG: 2 INJECTION, SOLUTION INTRAMUSCULAR; INTRAVENOUS at 14:14

## 2024-12-18 RX ADMIN — SODIUM CHLORIDE, PRESERVATIVE FREE 30 ML: 5 INJECTION INTRAVENOUS at 14:10

## 2024-12-18 RX ADMIN — EPHEDRINE SULFATE 5 MG: 5 INJECTION INTRAVENOUS at 14:18

## 2024-12-18 RX ADMIN — SODIUM CHLORIDE, PRESERVATIVE FREE 30 ML: 5 INJECTION INTRAVENOUS at 14:24

## 2024-12-18 RX ADMIN — LIDOCAINE HYDROCHLORIDE 100 MG: 20 INJECTION, SOLUTION EPIDURAL; INFILTRATION; INTRACAUDAL; PERINEURAL at 14:08

## 2024-12-18 RX ADMIN — EPHEDRINE SULFATE 10 MG: 5 INJECTION INTRAVENOUS at 14:22

## 2024-12-18 RX ADMIN — GLYCOPYRROLATE 0.1 MG: 0.2 INJECTION INTRAMUSCULAR; INTRAVENOUS at 14:14

## 2024-12-18 RX ADMIN — FENTANYL CITRATE 25 MCG: 50 INJECTION, SOLUTION INTRAMUSCULAR; INTRAVENOUS at 14:11

## 2024-12-18 RX ADMIN — MIDAZOLAM 2 MG: 1 INJECTION INTRAMUSCULAR; INTRAVENOUS at 13:57

## 2024-12-18 RX ADMIN — HYDROMORPHONE HYDROCHLORIDE 0.5 MG: 1 INJECTION, SOLUTION INTRAMUSCULAR; INTRAVENOUS; SUBCUTANEOUS at 14:58

## 2024-12-18 RX ADMIN — FENTANYL CITRATE 50 MCG: 50 INJECTION, SOLUTION INTRAMUSCULAR; INTRAVENOUS at 14:06

## 2024-12-18 RX ADMIN — SODIUM CHLORIDE, PRESERVATIVE FREE 30 ML: 5 INJECTION INTRAVENOUS at 14:14

## 2024-12-18 RX ADMIN — Medication 100 MCG: at 14:22

## 2024-12-18 RX ADMIN — PROPOFOL 165 MG: 10 INJECTION, EMULSION INTRAVENOUS at 14:08

## 2024-12-18 RX ADMIN — Medication 200 MG: at 14:08

## 2024-12-18 RX ADMIN — Medication 100 MCG: at 14:14

## 2024-12-18 ASSESSMENT — PAIN SCALES - GENERAL
PAINLEVEL_OUTOF10: 2
PAINLEVEL_OUTOF10: 6

## 2024-12-18 ASSESSMENT — PAIN - FUNCTIONAL ASSESSMENT: PAIN_FUNCTIONAL_ASSESSMENT: 0-10

## 2024-12-18 NOTE — ANESTHESIA PRE PROCEDURE
Department of Anesthesiology  Preprocedure Note       Name:  Sandra Mills   Age:  39 y.o.  :  1985                                          MRN:  [id number]         Date:  2024      Surgeon: Surgeon(s):  Lulú Trammell DO    Procedure: Procedure(s):  HYSTEROSCOPY DILATATION CURETTAGE WITH MYOSURE    Medications prior to admission:   Prior to Admission medications    Medication Sig Start Date End Date Taking? Authorizing Provider   medroxyPROGESTERone (PROVERA) 10 MG tablet Take 1 tablet by mouth in the morning and at bedtime When bleeding decreases over 48hours then space the pills every 8h then every 12h then once a day please 24  Yes Lulú Trammell DO   levothyroxine (SYNTHROID) 50 MCG tablet Take 1 tablet by mouth every morning (before breakfast) 24  Yes Suzanna Nuñez MD   fexofenadine (ALLEGRA) 180 MG tablet Take 1 tablet by mouth daily as needed (allergies) 19  Yes Automatic Reconciliation, Ar       Current medications:    Current Facility-Administered Medications   Medication Dose Route Frequency Provider Last Rate Last Admin    sodium chloride flush 0.9 % injection 5-40 mL  5-40 mL IntraVENous 2 times per day Lulú Trammell,         sodium chloride flush 0.9 % injection 5-40 mL  5-40 mL IntraVENous PRN Lulú Trammell, DO        0.9 % sodium chloride infusion   IntraVENous PRN Lulú Trammell,         lidocaine 1 % injection 1 mL  1 mL IntraDERmal Once PRN Fidel Cm MD        fentaNYL (SUBLIMAZE) injection 100 mcg  100 mcg IntraVENous Once PRN Fidel Cm MD        lactated ringers infusion   IntraVENous Continuous Fidel Cm MD        sodium chloride flush 0.9 % injection 5-40 mL  5-40 mL IntraVENous 2 times per day Fidel Cm MD        sodium chloride flush 0.9 % injection 5-40 mL  5-40 mL IntraVENous PRN Fidel Cm MD        0.9 % sodium chloride infusion   IntraVENous PRN Fidel Cm MD        midazolam PF (VERSED)

## 2024-12-18 NOTE — PERIOP NOTE
MD Orourke at bedside with patient. Pt VSS stable. Pain and Nausea controlled at this time. Verbal sign out per MD when pacu care is completed. Plan of care continues.

## 2024-12-18 NOTE — ANESTHESIA POSTPROCEDURE EVALUATION
Department of Anesthesiology  Postprocedure Note    Patient: Sandra Mills  MRN: [id number]  YOB: 1985  Date of evaluation: 12/18/2024    Procedure Summary       Date: 12/18/24 [facility]  / [facility]    Anesthesia Start: 1354 Anesthesia Stop: 1451    Procedure: HYSTEROSCOPY DILATATION CURETTAGE (Vagina ) Diagnosis:       Excessive or frequent menstruation      Endometrial polyp      (Excessive or frequent menstruation [N92.0])      (Endometrial polyp [N84.0])    Surgeons: Lulú Trammell DO Responsible Provider: Chelsie Orourke MD    Anesthesia Type: General ASA Status: 2            Anesthesia Type: General    Arthur Phase I: Arthur Score: 8    Arthur Phase II:      Anesthesia Post Evaluation    Patient location during evaluation: PACU  Patient participation: complete - patient participated  Level of consciousness: awake and alert  Airway patency: patent  Nausea & Vomiting: no nausea  Cardiovascular status: hemodynamically stable  Respiratory status: acceptable  Hydration status: euvolemic  Pain management: adequate and satisfactory to patient        No notable events documented.

## 2024-12-18 NOTE — OP NOTE
HYSTEROSCOPY DILATATION & CURETTAGE FULL OP NOTE    Patient ID:      Name: Sandra Mills    Medical Record Number: [id number]    YOB: 1985    DATE OF PROCEDURE: 12/18/24     PREOPERATIVE DIAGNOSIS:  Excessive or frequent menstruation [N92.0]  Endometrial polyp [N84.0]     POSTOPERATIVE DIAGNOSIS:  * No post-op diagnosis entered *    PROCEDURE: Hysteroscopy, dilatation & curettage.    SURGEON:  Lulú Trammell DO    ASSISTANT:  none    ANESTHESIA:  LMA    EBL: Minimal cc    FINDINGS: Diffuse polypoid tissue. No large discrete uterine polyp. Small endocervical polyp. Normal cavity at case conclusion.     SPECIMENS: Endometrial and endocervical curretings    PROCEDURE IN DETAIL: The patient was placed on the Operating Room table in the supine position.  The patient underwent general endotracheal anesthesia and was repositioned in the dorsal lithotomy position, prepped and draped in the usual sterile fashion for vaginal surgery. Time out was done to confirm the operating procedure, surgeon, patient and site.  Once confirmed by the team, procedure was started. The cervix was exposed with a weighted vaginal speculum and grasped with a tenaculum. The cervix was serially dilated. Diagnostic hysteroscope was introduced into the endometrial cavity using saline. The findings were noted as above.  A thorough endometrial curettage was performed with a serrated currette. Repeat curettage was performed as needed following reinspection of the uterine cavity with a hysteroscope using saline as a distention median was accomplished without problems. The endometrial cavity appeared normal following the curettage.    Following the case, the hysteroscope was removed. The tenaculum was removed from the cervix. Silver nitrate applied to sites.  Hemostasis was assured. All instruments and retractors were removed from her vagina.  She was then cleaned up, awakened, and transferred to the Recovery Room in satisfactory

## 2025-01-09 ENCOUNTER — OFFICE VISIT (OUTPATIENT)
Dept: OBGYN CLINIC | Age: 40
End: 2025-01-09

## 2025-01-09 VITALS
SYSTOLIC BLOOD PRESSURE: 112 MMHG | BODY MASS INDEX: 43.94 KG/M2 | HEIGHT: 63 IN | WEIGHT: 248 LBS | DIASTOLIC BLOOD PRESSURE: 74 MMHG

## 2025-01-09 DIAGNOSIS — Z09 POSTOP CHECK: Primary | ICD-10-CM

## 2025-01-09 PROCEDURE — 99024 POSTOP FOLLOW-UP VISIT: CPT | Performed by: OBSTETRICS & GYNECOLOGY

## 2025-01-09 NOTE — PROGRESS NOTES
CC:  Postop follow-up      HPI:  Sandra presents for postop visit from [facility]    Op findings:   FINDINGS: Diffuse polypoid tissue. No large discrete uterine polyp. Small endocervical polyp. Normal cavity at case conclusion.   Pathology:    A:  Endometrial and endocervical, curettage:   - Weakly proliferative phase endometrium and possible endometrial polyp.   - Benign endocervical cells.     Patient doing well postop without significant complaints. Voiding without difficulty.  Tolerating regular diet w/out nausea/vomiting; +flatus and bms.  Pain controlled with Ibuprofen only.  No VB.  Ambulating well.  No issues today.         PE:  /74   Ht 1.6 m (5' 3\")   Wt 112.5 kg (248 lb)   BMI 43.93 kg/m²       Constitutional: She appears well-developed and well-nourished. No distress.    HENT:    Head: Normocephalic and atraumatic.    Cardiovascular: RRR  Pulmonary/Chest: CTAB  Ext: No c/c/e    A/P:  Postop state   Stable Post op condition    Return to normal activity     Options discussed to prevent further polyps including weight loss. Other options include cyclic progesterone, LNG IUD  Patient opts for observation for now. Still considering pregnancy. Considering cyclic Aygestin.     Lulú Trammell DO

## 2025-02-17 RX ORDER — MEDROXYPROGESTERONE ACETATE 10 MG
10 TABLET ORAL 2 TIMES DAILY
Qty: 30 TABLET | Refills: 3 | Status: CANCELLED | OUTPATIENT
Start: 2025-02-17

## 2025-02-18 RX ORDER — MEDROXYPROGESTERONE ACETATE 10 MG
10 TABLET ORAL DAILY
Qty: 10 TABLET | Refills: 12 | Status: SHIPPED | OUTPATIENT
Start: 2025-02-18

## 2025-02-18 RX ORDER — MEDROXYPROGESTERONE ACETATE 10 MG
10 TABLET ORAL 2 TIMES DAILY
Qty: 30 TABLET | Refills: 3 | Status: SHIPPED | OUTPATIENT
Start: 2025-02-18

## (undated) DEVICE — SURGICAL PROCEDURE PACK BASIC ST FRANCIS

## (undated) DEVICE — CONTAINER SPEC FRMLN 120ML --

## (undated) DEVICE — SOL ANTI-FOG 6ML MEDC -- MEDICHOICE - CONVERT TO 358427

## (undated) DEVICE — SUTURE MCRYL SZ 0 L18IN ABSRB VLT MO-5 L31MM 1/2 CIR TAPR Y814G

## (undated) DEVICE — MEDI-VAC YANKAUER SUCTION HANDLE W/BULBOUS TIP: Brand: CARDINAL HEALTH

## (undated) DEVICE — SPONGE LAP 18X18IN STRL -- 5/PK

## (undated) DEVICE — (D)PREP SKN CHLRAPRP APPL 26ML -- CONVERT TO ITEM 371833

## (undated) DEVICE — CONTAINER SPEC HISTOLOGY 900ML POLYPR

## (undated) DEVICE — GLOVE SURG SZ 65 THK91MIL LTX FREE SYN POLYISOPRENE

## (undated) DEVICE — JELLY LUBRICATING 10GM PREFIL SYR LUBE

## (undated) DEVICE — AMD ANTIMICROBIAL NON-ADHERENT PAD,0.2% POLYHEXAMETHYLENE BIGUANIDE HCI (PHMB): Brand: TELFA

## (undated) DEVICE — SUTURE PERMAHAND SZ 3-0 L30IN NONABSORBABLE BLK SILK BRAID A304H

## (undated) DEVICE — SUTURE PDS II SZ 1 L96IN ABSRB VLT TP-1 L65MM 1/2 CIR Z880G

## (undated) DEVICE — BLADE ELECTRODE: Brand: EDGE

## (undated) DEVICE — 2000CC GUARDIAN II: Brand: GUARDIAN

## (undated) DEVICE — DUAL LUMEN STOMACH TUBE: Brand: SALEM SUMP

## (undated) DEVICE — BLADELESS OPTICAL TROCAR WITH FIXATION CANNULA: Brand: VERSAPORT

## (undated) DEVICE — SOLUTION IRRIG 3000ML 0.9% SOD CHL FLX CONT 0797208] ICU MEDICAL INC]

## (undated) DEVICE — KENDALL SCD EXPRESS SLEEVES, KNEE LENGTH, LARGE: Brand: KENDALL SCD

## (undated) DEVICE — REM POLYHESIVE ADULT PATIENT RETURN ELECTRODE: Brand: VALLEYLAB

## (undated) DEVICE — CARDINAL HEALTH FLEXIBLE LIGHT HANDLE COVER: Brand: CARDINAL HEALTH

## (undated) DEVICE — LOGICUT SCISSOR LENGTH 320MM: Brand: LOGI - LAPAROSCOPIC INSTRUMENT SYSTEM

## (undated) DEVICE — MEDI-VAC NON-CONDUCTIVE SUCTION TUBING: Brand: CARDINAL HEALTH

## (undated) DEVICE — Device

## (undated) DEVICE — Z DUPLICATE USE 2847003 INJECTOR UTER

## (undated) DEVICE — SUTURE COAT VCRL SZ 4-0 L18IN ABSRB UD L19MM PS-2 1/2 CIR J496G

## (undated) DEVICE — UNIVERSAL FIXATION CANNULA: Brand: VERSAONE

## (undated) DEVICE — 2, DISPOSABLE SUCTION/IRRIGATOR WITHOUT DISPOSABLE TIP: Brand: STRYKEFLOW

## (undated) DEVICE — CYSTO/BLADDER IRRIGATION SET, REGULATING CLAMP

## (undated) DEVICE — DRAPE TWL SURG 16X26IN BLU ORB04] ALLCARE INC]

## (undated) DEVICE — GOWN,REINF,POLY,ECL,PP SLV,XL: Brand: MEDLINE

## (undated) DEVICE — GLOVE ORANGE PI 7   MSG9070

## (undated) DEVICE — SOLUTION IV 1000ML 0.9% SOD CHL

## (undated) DEVICE — PERI-PAD,MODERATE: Brand: CURITY

## (undated) DEVICE — DRAPE,UNDERBUTTOCKS,PCH,STERILE: Brand: MEDLINE

## (undated) DEVICE — [HIGH FLOW INSUFFLATOR,  DO NOT USE IF PACKAGE IS DAMAGED,  KEEP DRY,  KEEP AWAY FROM SUNLIGHT,  PROTECT FROM HEAT AND RADIOACTIVE SOURCES.]: Brand: PNEUMOSURE

## (undated) DEVICE — SUTURE PERMAHAND SZ 2-0 L12X18IN NONABSORBABLE BLK SILK A185H

## (undated) DEVICE — CYSTO: Brand: MEDLINE INDUSTRIES, INC.

## (undated) DEVICE — BLUNT TROCAR WITH THREADED ANCHOR: Brand: VERSAONE

## (undated) DEVICE — TRAY CATH 16F DRN BG LTX -- CONVERT TO ITEM 363158

## (undated) DEVICE — TRAY CATH 16F URIN MTR LTX -- CONVERT TO ITEM 363111

## (undated) DEVICE — SUTURE CHROMIC GUT SZ 0 L27IN ABSRB BRN L40MM CT 1/2 CIR 802H

## (undated) DEVICE — KENDALL SCD EXPRESS SLEEVES, KNEE LENGTH, MEDIUM: Brand: KENDALL SCD

## (undated) DEVICE — TRAY PREP DRY W/ PREM GLV 2 APPL 6 SPNG 2 UNDPD 1 OVERWRAP

## (undated) DEVICE — MINOR LITHOTOMY PACK: Brand: MEDLINE INDUSTRIES, INC.

## (undated) DEVICE — DERMABOND SKIN ADH 0.7ML -- DERMABOND ADVANCED 12/BX

## (undated) DEVICE — INSUFFLATION NEEDLE: Brand: SURGINEEDLE

## (undated) DEVICE — PVC URETHRAL CATHETER: Brand: DOVER

## (undated) DEVICE — INTENDED FOR TISSUE SEPARATION, AND OTHER PROCEDURES THAT REQUIRE A SHARP SURGICAL BLADE TO PUNCTURE OR CUT.: Brand: BARD-PARKER SAFETY BLADES SIZE 10, STERILE

## (undated) DEVICE — INTENDED FOR TISSUE SEPARATION, AND OTHER PROCEDURES THAT REQUIRE A SHARP SURGICAL BLADE TO PUNCTURE OR CUT.: Brand: BARD-PARKER SAFETY BLADES SIZE 15, STERILE

## (undated) DEVICE — ABC PROBE 5 MM FOOTSWITCHING PROBE: Brand: ABC

## (undated) DEVICE — SHEET, T, LAPAROTOMY, STERILE: Brand: MEDLINE

## (undated) DEVICE — PAD,NON-ADHERENT,3X8,STERILE,LF,1/PK: Brand: MEDLINE

## (undated) DEVICE — SLIM BODY SKIN STAPLER: Brand: APPOSE ULC

## (undated) DEVICE — CANISTER, RIGID, 2000CC: Brand: MEDLINE INDUSTRIES, INC.

## (undated) DEVICE — SOLUTION IRRIG 3000ML 0.9% SOD CHL USP UROMATIC PLAS CONT